# Patient Record
Sex: FEMALE | Employment: OTHER
[De-identification: names, ages, dates, MRNs, and addresses within clinical notes are randomized per-mention and may not be internally consistent; named-entity substitution may affect disease eponyms.]

---

## 2020-07-29 ENCOUNTER — TRANSCRIBE ORDERS (OUTPATIENT)
Dept: OTHER | Age: 75
End: 2020-07-29

## 2020-07-29 ENCOUNTER — TELEPHONE (OUTPATIENT)
Dept: RHEUMATOLOGY | Facility: CLINIC | Age: 75
End: 2020-07-29

## 2020-07-29 DIAGNOSIS — K13.79 ORAL PAIN OF UNKNOWN ETIOLOGY: ICD-10-CM

## 2020-07-29 DIAGNOSIS — R20.0 NUMBNESS OF FACE: ICD-10-CM

## 2020-07-29 DIAGNOSIS — K14.6 BURNING MOUTH SYNDROME: Primary | ICD-10-CM

## 2020-07-29 NOTE — LETTER
Adult Rheumatology Clinic  [address], Mail [address]                        [place] [address]  Ph: [phone]                     Fax: [phone]   Date: July 29, 2020  Name: Karol Mohs  YOB: 1945  MRN: [id number]       Dear Referring Provider,    Thank you for the referral. Your patient does not meet the criteria for an appointment as numbness of face; Oral pain of unknown etiology is not a diagnosis that the Adult Rheumatology evaluates/manages/treats per our scheduling guidelines. We encourage you to refer your patient to a community Rheumatology clinic such as [facility] or a provider of your choice.     Sincerely,  The Division of Arthritis and Autoimmune Disorders

## 2020-07-29 NOTE — TELEPHONE ENCOUNTER
Patient is referred by Gabe Prakash for Numbness of face; Oral pain of unknown etiology which is not a diagnosis that the Adult Rheumatology clinic evaluates/manages/treats per scheduling protocol.     Letter sent to referring.    Josefina Diaz CMA   7/29/2020 10:57 AM

## 2020-07-30 ENCOUNTER — DOCUMENTATION ONLY (OUTPATIENT)
Dept: CARE COORDINATION | Facility: CLINIC | Age: 75
End: 2020-07-30

## 2020-08-05 ENCOUNTER — PRE VISIT (OUTPATIENT)
Dept: NEUROLOGY | Facility: CLINIC | Age: 75
End: 2020-08-05

## 2020-08-05 ENCOUNTER — TELEPHONE (OUTPATIENT)
Dept: NEUROLOGY | Facility: CLINIC | Age: 75
End: 2020-08-05

## 2020-08-05 NOTE — TELEPHONE ENCOUNTER
[facility] Call Center    Phone Message    May a detailed message be left on voicemail: yes     Reason for Call: Other: Elo calling due to her labs and imaging have not been scheduled yet and was wondering if she needed to reschedule her appointment 8/7/20. Please call her back at your earliest convenience to discuss.     Action Taken: Message routed to:  [facility]  NEUROLOGY    Travel Screening: Not Applicable

## 2020-08-05 NOTE — TELEPHONE ENCOUNTER
FUTURE VISIT INFORMATION      FUTURE VISIT INFORMATION:    Date: 8/7/2020    Time: 145pm    Location: [facility]  REFERRAL INFORMATION:    Referring provider:  Dr. Prakash    Referring providers clinic:  [facility]     Reason for visit/diagnosis  Facial Numbness     RECORDS REQUESTED FROM:       Clinic name Comments Records Status Imaging Status   Internal Dr. Prakash Requested  N/A                                    -8/7/2020-Records from [facility] forwarded to Neurology Nurses and clinic Supervisor HTEO Lino via email-MR @ [id number]

## 2020-08-07 ENCOUNTER — VIRTUAL VISIT (OUTPATIENT)
Dept: NEUROLOGY | Facility: CLINIC | Age: 75
End: 2020-08-07
Payer: MEDICARE

## 2020-08-07 DIAGNOSIS — G50.0 TRIGEMINAL NEURALGIA: Primary | ICD-10-CM

## 2020-08-07 DIAGNOSIS — I63.81 CEREBROVASCULAR ACCIDENT (CVA) DUE TO OCCLUSION OF SMALL ARTERY (H): ICD-10-CM

## 2020-08-07 RX ORDER — SIMVASTATIN 20 MG
1 TABLET ORAL DAILY
COMMUNITY
Start: 2020-02-21

## 2020-08-07 RX ORDER — HYDROCHLOROTHIAZIDE 25 MG/1
1 TABLET ORAL DAILY
COMMUNITY
Start: 2020-05-31

## 2020-08-07 RX ORDER — CLONAZEPAM 1 MG/1
1 TABLET ORAL PRN
COMMUNITY
Start: 2020-04-14

## 2020-08-07 RX ORDER — ATENOLOL 50 MG/1
1 TABLET ORAL DAILY
COMMUNITY
Start: 2019-08-23

## 2020-08-07 NOTE — LETTER
8/7/2020       RE: Karol Mohs  [address]  [facility] [address]     Dear Colleague,    Thank you for referring your patient, Karol Mohs, to the [facility] NEUROLOGY at [facility]. Please see a copy of my visit note below.    [facility] Neurology Consultation    Karol Mohs MRN# [id number]   Age: 74 year old YOB: 1945     Requesting physician: Gabe Prakash     Reason for Consultation: facial numbness      History of Presenting Symptoms:   Karol Mohs is a 74 year old female who presents today for evaluation of facial numbness.  The patient has been followed through Dermatology and was most recently seen 4/14/2020 with Dr. Guillen for burning mouth syndrome.  She was taking Clonazepam 1 mg four times weekly, dissolved in water and swished in mouth for two minutes for her symptoms of burning pain and discomfort with dry mouth in the roof of her mouth.  Prior w/ups had revealed no vitamin deficiencies, normal thyroid, tate ANDRE, SSA.  Prior to seeing dermatology, she was seen through [facility] spine clinic for upper back pain and headaches.  Once she started on clonazepam, she found that her mouth symptoms had improved as well as her headaches.      On 7/26/2020, the patient was seen through the ED in [place] for dizziness which came on abruptly for 10 minutes, was described as room spinning, led to nausea w/discomfort but was without other neurological symptoms.  She was found to have systolic blood pressures in the low 200s during evaluation and she had a heart rate in the 40's.  No head imaging was done, her symptoms improved with zofran and meclizine, and the patient was discharged that day.     Today, she hasn't been taking her clonazepam more than three times a week.  She doesn't swallow the medication, just swishes and spits.  Regarding her prior dizzy period, she doesn't report another occurrence of these symptoms. Her blood pressures have been near  125/87 to 160/85 with multiple measurements through the day.  She is scheduled to see both her PCP and her dermatologist in the next month.    Regarding her facial numbness, she indicates that she gets numb in her roof of the mouth, then upper lip (middle), then nose.  When she gets the numbness, she then will develop a headache.  Her headache starts in the b/l neck and posterior occipital, or it can start in the front of her head.  At times, her pain is an ache that is steady without throbbing.  She has no photophobia, phonophobia, no visual aura.  She doesn't get off balance during these episodes.  Occasionally she may feel nauseated but never vomits.  The symptoms are present every day, but never occur until early afternoon.  The symptoms go away after hours, usually with sleep.  She has tried Tylenol, Aspirin but these have been unhelpful.    The patient was unaware that in 2016 she had imaging which showed a traumatic SAH and a prior lacunar infarction of the right caudate head.  We discussed these images and that she should be on a daily aspirin for stroke prevention.      Past Medical History:   Chronic headache  GERD  HTN  Kidney stones  Sleep apnea  HLD   Chronic lacunar infarct of right caudate head  Hx of SAH (post trauma)     Past Surgical History:   Inguinal herniorrhaphy on left  Lumpectomy on right 1996  tonsilectomy and adenoidectomy     Social History:   , has 4 children. Never smoked. No alcohol use.     Family History:   Mother - heart disease  Father - asthma  Sister - heart disease  Sister - kidney cancer     Medications:   Omeprazole  Simvastatin  Zolpidem  atenolol     Allergies:   Allergies not on file     Review of Systems:   A comprehensive 10 point review of systems (constitutional, ENT, cardiac, peripheral vascular, lymphatic, respiratory, GI, , Musculoskeletal, skin, Neurological) was performed and found to be negative except as described in this note.     Imaging:  CT head:  5/22/2016  IMPRESSION:    1.  Interval involution of a small subarachnoid hemorrhage noted on   previous exam.   2.  No evidence of acute intracranial pathology.   3.  Nasal bone fracture.     CT head: 5/21/2016  - There are 2 small punctate hyperdensities in a superior most right frontoparietal sulci (axial image 37).  Small lacunar infarct of the right caudate head.  Brain attenuation and morphology is otherwise normal.  Gray-white matter differentiation is maintained.  No mass or mass effect.  No extra-axial masses or fluid collections.  Diminutive changes in the lenses of the globe suggests cataract or sequela of cataract surgery.  Mastoid air cells, calvarium and skull base are normal.  Comminuted nasal bone fracture with  adjacent soft tissue swelling.  Please see separate maxillofacial CT for detailed findings.   Bilateral intracranial ICA vascular calcifications.  1.  Tiny hyperdensity in the superior right frontoparietal lobe sulci suggestive of subarachnoid hemorrhage.  Short-term follow-up is suggested.   2.  Comminuted nasal bone fracture with adjacent soft tissue swelling.  Please see separate maxillofacial CT for findings of the maxillofacial structures.   3.  Chronic lacunar infarct right caudate head.         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  I am concerned that her creeping and progressive burning/numbness goes along a distribution common with basilar artery stenosis or occlusions, and given her stroke history with recent ED visit, further imaging with MRI/MRA stroke protocol should be done.  I am unaware how benzodiazepine may be affecting her symptoms, especially if she isn't swallowing the medicatoin, and will leave further management of this to her dermatologist.  If no stroke is seen, or vascular  compression of a trigeminal nerve maxillary branch is noted, she may benefit from a trial of carbamazepine for trigmeinal neuralgia, to see if it reduces her symptoms of roof of mouth, tip of nose sensations.  If this fails, she may benefit from trial of verapamil for a basilar migraine treatment.     Plan:  - MRI/MRA brain w/wout contrast  - ASA 81 mg QD    Follow up in Neurology clinic in 4 weeks or earlier as needed should new concerns arise.    MICHI Eric D.O.   of Neurology    Greater than 50% of the total time (40 min) in this patient encounter was spent on counseling and/or coordination of care. We reviewed diagnostic results, impressions, and discussed other possible tests if symptoms do not improve. We discussed the implications of the diagnosis, as well as risks and benefits of management options. We reviewed treatment instructions and our scheduled follow-up as specified in the discharge plan. We also discussed the importance of compliance with the chosen course of treatment. The patient is in agreement with this plan and has no further questions.

## 2020-08-07 NOTE — PROGRESS NOTES
[facility] Neurology Consultation    Karol Mohs MRN# [id number]   Age: 74 year old YOB: 1945     Requesting physician: Gabe Prakash     Reason for Consultation: facial numbness      History of Presenting Symptoms:   Karol Mohs is a 74 year old female who presents today for evaluation of facial numbness.  The patient has been followed through Dermatology and was most recently seen 4/14/2020 with Dr. Guillen for burning mouth syndrome.  She was taking Clonazepam 1 mg four times weekly, dissolved in water and swished in mouth for two minutes for her symptoms of burning pain and discomfort with dry mouth in the roof of her mouth.  Prior w/ups had revealed no vitamin deficiencies, normal thyroid, tate ANDRE, SSA.  Prior to seeing dermatology, she was seen through [facility] spine clinic for upper back pain and headaches.  Once she started on clonazepam, she found that her mouth symptoms had improved as well as her headaches.      On 7/26/2020, the patient was seen through the ED in [place] for dizziness which came on abruptly for 10 minutes, was described as room spinning, led to nausea w/discomfort but was without other neurological symptoms.  She was found to have systolic blood pressures in the low 200s during evaluation and she had a heart rate in the 40's.  No head imaging was done, her symptoms improved with zofran and meclizine, and the patient was discharged that day.     Today, she hasn't been taking her clonazepam more than three times a week.  She doesn't swallow the medication, just swishes and spits.  Regarding her prior dizzy period, she doesn't report another occurrence of these symptoms. Her blood pressures have been near 125/87 to 160/85 with multiple measurements through the day.  She is scheduled to see both her PCP and her dermatologist in the next month.    Regarding her facial numbness, she indicates that she gets numb in her roof of the mouth, then upper lip (middle), then  nose.  When she gets the numbness, she then will develop a headache.  Her headache starts in the b/l neck and posterior occipital, or it can start in the front of her head.  At times, her pain is an ache that is steady without throbbing.  She has no photophobia, phonophobia, no visual aura.  She doesn't get off balance during these episodes.  Occasionally she may feel nauseated but never vomits.  The symptoms are present every day, but never occur until early afternoon.  The symptoms go away after hours, usually with sleep.  She has tried Tylenol, Aspirin but these have been unhelpful.    The patient was unaware that in 2016 she had imaging which showed a traumatic SAH and a prior lacunar infarction of the right caudate head.  We discussed these images and that she should be on a daily aspirin for stroke prevention.      Past Medical History:   Chronic headache  GERD  HTN  Kidney stones  Sleep apnea  HLD   Chronic lacunar infarct of right caudate head  Hx of SAH (post trauma)     Past Surgical History:   Inguinal herniorrhaphy on left  Lumpectomy on right 1996  tonsilectomy and adenoidectomy     Social History:   , has 4 children. Never smoked. No alcohol use.     Family History:   Mother - heart disease  Father - asthma  Sister - heart disease  Sister - kidney cancer     Medications:   Omeprazole  Simvastatin  Zolpidem  atenolol     Allergies:   Allergies not on file     Review of Systems:   A comprehensive 10 point review of systems (constitutional, ENT, cardiac, peripheral vascular, lymphatic, respiratory, GI, , Musculoskeletal, skin, Neurological) was performed and found to be negative except as described in this note.     Imaging:  CT head: 5/22/2016  IMPRESSION:    1.  Interval involution of a small subarachnoid hemorrhage noted on   previous exam.   2.  No evidence of acute intracranial pathology.   3.  Nasal bone fracture.     CT head: 5/21/2016  - There are 2 small punctate hyperdensities in a  superior most right frontoparietal sulci (axial image 37).  Small lacunar infarct of the right caudate head.  Brain attenuation and morphology is otherwise normal.  Gray-white matter differentiation is maintained.  No mass or mass effect.  No extra-axial masses or fluid collections.  Diminutive changes in the lenses of the globe suggests cataract or sequela of cataract surgery.  Mastoid air cells, calvarium and skull base are normal.  Comminuted nasal bone fracture with  adjacent soft tissue swelling.  Please see separate maxillofacial CT for detailed findings.   Bilateral intracranial ICA vascular calcifications.  1.  Tiny hyperdensity in the superior right frontoparietal lobe sulci suggestive of subarachnoid hemorrhage.  Short-term follow-up is suggested.   2.  Comminuted nasal bone fracture with adjacent soft tissue swelling.  Please see separate maxillofacial CT for findings of the maxillofacial structures.   3.  Chronic lacunar infarct right caudate head.         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  I am concerned that her creeping and progressive burning/numbness goes along a distribution common with basilar artery stenosis or occlusions, and given her stroke history with recent ED visit, further imaging with MRI/MRA stroke protocol should be done.  I am unaware how benzodiazepine may be affecting her symptoms, especially if she isn't swallowing the medicatoin, and will leave further management of this to her dermatologist.  If no stroke is seen, or vascular compression of a trigeminal nerve maxillary branch is noted, she may benefit from a trial of carbamazepine for trigmeinal neuralgia, to see if it reduces her symptoms of roof of mouth, tip of nose sensations.  If this fails, she may benefit from trial of  verapamil for a basilar migraine treatment.     Plan:  - MRI/MRA brain w/wout contrast  - ASA 81 mg QD    Follow up in Neurology clinic in 4 weeks or earlier as needed should new concerns arise.    MICHI Eric D.O.   of Neurology    Greater than 50% of the total time (40 min) in this patient encounter was spent on counseling and/or coordination of care. We reviewed diagnostic results, impressions, and discussed other possible tests if symptoms do not improve. We discussed the implications of the diagnosis, as well as risks and benefits of management options. We reviewed treatment instructions and our scheduled follow-up as specified in the discharge plan. We also discussed the importance of compliance with the chosen course of treatment. The patient is in agreement with this plan and has no further questions.

## 2020-08-07 NOTE — Clinical Note
Could you assist this patient in making sure she gets her MRI stroke protocol in [facility] system?

## 2020-08-10 ENCOUNTER — TELEPHONE (OUTPATIENT)
Dept: NEUROLOGY | Facility: CLINIC | Age: 75
End: 2020-08-10

## 2020-08-10 NOTE — TELEPHONE ENCOUNTER
MRI orders faxed to [facility] in [place].  .  Requested that they push results to  or mail disc to our [facility] address.

## 2020-08-10 NOTE — TELEPHONE ENCOUNTER
Patient states she is returning a call to Nat, Please call to discuss thank you. Patient states the MRI will take place at [facility] in [facility] and the fax number is [phone].

## 2020-08-11 ENCOUNTER — TELEPHONE (OUTPATIENT)
Dept: NEUROLOGY | Facility: CLINIC | Age: 75
End: 2020-08-11

## 2020-08-11 NOTE — TELEPHONE ENCOUNTER
[facility] Call Center    Phone Message    May a detailed message be left on voicemail: no     Reason for Call: Other: Kika calling with questions on Elo's MRI order. Elo stated that she would also need an MRA and that is not on the order. Please fax the MRA order to [phone], if needed. Please call Kika back to confirm the needs of the order.    Action Taken: Other:  NEUROLOGY    Travel Screening: Not Applicable

## 2020-08-11 NOTE — TELEPHONE ENCOUNTER
I returned a call to [facility].  Dr Eric ordered MRI Brain Stroke and told the pt he also wanted a MRA but I do not see that the MRA is ordered.  I sent a message to Dr Eric asking him to place this order.  Once ordered I will fax it to [facility] in [place]

## 2020-08-12 NOTE — TELEPHONE ENCOUNTER
[facility] Call Center    Phone Message    May a detailed message be left on voicemail: yes     Reason for Call: Other: Elo calling to request a call back from the clinic to discuss her MRA order. Elo says the MRI ordeer was sent over to [facility], but they are waiting for her MRA. Elo says she believes Dr. Eric is on vacation, but she would like ot have the MRA done right away with the MRI, so she is wondering if someone else can complete the order to fax over. Elo would like to discuss this with the care team. Please give Elo a call back at your earliest convenience to discuss.     Action Taken: Message routed to:  [facility]  Neuro    Travel Screening: Not Applicable

## 2020-08-13 NOTE — TELEPHONE ENCOUNTER
I returned pt call. I let her know we had checked with the provider covering for Dr. Eric regarding her request for MRA order. From Dr. Eric's office note we are not sure if Dr Eric wanted just MRA head or both Head and neck. We will have to wait for MRA orders until he returns.     Ruth BARKER

## 2020-08-17 DIAGNOSIS — I63.81 CEREBROVASCULAR ACCIDENT (CVA) DUE TO OCCLUSION OF SMALL ARTERY (H): Primary | ICD-10-CM

## 2020-08-18 ENCOUNTER — DOCUMENTATION ONLY (OUTPATIENT)
Dept: NEUROLOGY | Facility: CLINIC | Age: 75
End: 2020-08-18

## 2020-08-18 DIAGNOSIS — G50.0 TRIGEMINAL NEURALGIA: Primary | ICD-10-CM

## 2020-08-19 ENCOUNTER — TELEPHONE (OUTPATIENT)
Dept: NEUROLOGY | Facility: CLINIC | Age: 75
End: 2020-08-19

## 2020-08-19 NOTE — TELEPHONE ENCOUNTER
[facility] Call Center    Phone Message    May a detailed message be left on voicemail: yes     Reason for Call: Order(s): Other:   Reason for requested: MRA/MRI    Date needed: ASAP  Provider name: Dr. Jeaneth Anand from [facility] is calling to get an OK to follow RAD protocol for the orders. Pt is scheduled this Friday and needs confirmation ASAP       Action Taken: Other:  NEUROLOGY     Travel Screening: Not Applicable

## 2020-08-21 ENCOUNTER — TRANSFERRED RECORDS (OUTPATIENT)
Dept: HEALTH INFORMATION MANAGEMENT | Facility: CLINIC | Age: 75
End: 2020-08-21

## 2020-08-24 ENCOUNTER — TELEPHONE (OUTPATIENT)
Dept: NEUROLOGY | Facility: CLINIC | Age: 75
End: 2020-08-24

## 2020-08-24 NOTE — TELEPHONE ENCOUNTER
Received MRI from [facility]   Records Date of service: 8/21/20  Copy has been sent to scanning and encounter routed to specialty nurse for review.

## 2020-09-01 ENCOUNTER — TELEPHONE (OUTPATIENT)
Dept: NEUROLOGY | Facility: CLINIC | Age: 75
End: 2020-09-01

## 2020-09-01 NOTE — TELEPHONE ENCOUNTER
Health Call Center    Phone Message    May a detailed message be left on voicemail: yes     Reason for Call: Requesting Results   Name/type of test: MRI/MRA   Date of test: 8/21/20  Was test done at a location other than [facility] (Please fill in the location if not [facility])?: Yes: per pt at [facility] ([facility]).     And, pt reported that she received letter just last week that was dated on 8/07/20 from this provider that indicated, pt should schedule f/u in 4 weeks.  Questions:  Is this appt a virtual or in clinic appt?  And, 4 weeks from what date, 8/07/20?  Please call pt back to schedule the appt for her and give results of MRI/MRA.  Thank you.    Action Taken: Message routed to:  [facility]  Neurology    Travel Screening: Not Applicable

## 2020-09-02 NOTE — TELEPHONE ENCOUNTER
I called Elo back. I let her know Dr Eric reviewed her imaging and results were normal. He would like to have her follow up in a month. I helped her schedule on 9/30 at 9am for in person follow up. I provided clinic information as this will be her first in clinic appointment.     Ruth BARKER

## 2020-10-02 ENCOUNTER — VIRTUAL VISIT (OUTPATIENT)
Dept: NEUROLOGY | Facility: CLINIC | Age: 75
End: 2020-10-02
Payer: MEDICARE

## 2020-10-02 DIAGNOSIS — G50.0 TRIGEMINAL NEURALGIA: Primary | ICD-10-CM

## 2020-10-02 PROBLEM — K14.6 BURNING MOUTH SYNDROME: Status: ACTIVE | Noted: 2020-07-15

## 2020-10-02 PROBLEM — E11.9 TYPE 2 DIABETES MELLITUS WITHOUT COMPLICATION, WITHOUT LONG-TERM CURRENT USE OF INSULIN (H): Status: ACTIVE | Noted: 2020-06-22

## 2020-10-02 PROBLEM — Z12.11 SCREENING FOR COLON CANCER: Status: ACTIVE | Noted: 2017-12-13

## 2020-10-02 PROCEDURE — 99213 OFFICE O/P EST LOW 20 MIN: CPT | Mod: 95 | Performed by: PSYCHIATRY & NEUROLOGY

## 2020-10-02 RX ORDER — CARBAMAZEPINE 200 MG/1
TABLET ORAL
Qty: 120 TABLET | Refills: 1 | Status: SHIPPED | OUTPATIENT
Start: 2020-10-02 | End: 2020-11-24

## 2020-10-02 NOTE — PROGRESS NOTES
[facility] Neurology Follow Up Visit    Karol Mohs MRN# [id number]   Age: 74 year old YOB: 1945     Brief history of symptoms: The patient was initially seen in neurologic consultation on 8/7/2020 for evaluation of trigeminal neuralgia. Please see the comprehensive neurologic consultation note from that date in the Epic records for details. The patient's symptoms were that of numbness in her palate, upper lip, then nose followed by headache in the posterior occipital region.  It was also discussed that the patient had prior traumatic SAH as well as right caudate head lacunar infarction for which she was recommended to be on ASA.  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  She was to have MRI/MRA brain, continue with ASA, and her imaging was normal.  It was thought that if she had no major vascular findings, she may benefit from trial of carbamazepine for TN or verapamil for basilar migraine.    Interval history: After the patient stopped swishing clonazepam, she hasn't noticed a drastic change in her symptoms.  Last week, she had her first episode of mouth pain since our last visit.  Magic mouthwash seems to help tone down her mouth pain.  There is no specific taste involvement. Her most recent event of mouth sensory disturbance into the face wasn't associated with a headache of the posterior occiput.     Medications:     Current Outpatient Medications   Medication Sig     atenolol (TENORMIN) 50 MG tablet Take 1 tablet by mouth daily     clonazePAM (KLONOPIN) 1 MG tablet Take 1 tablet by mouth as needed      hydrochlorothiazide (HYDRODIURIL) 25 MG tablet Take 1 tablet by mouth daily     omeprazole (PRILOSEC) 20 MG DR capsule Take 1 capsule by mouth daily     simvastatin (ZOCOR) 20 MG tablet Take 1 tablet by mouth daily     zolpidem ER (AMBIEN CR) 12.5 MG CR tablet Take 1 tablet by mouth daily       Review of Systems:   A comprehensive 10 point review of systems (constitutional, ENT, cardiac, peripheral vascular, lymphatic, respiratory, GI, , Musculoskeletal, skin, Neurological) was performed and found to be negative except as described in this note.      Physical Exam:   General: Seated comfortably in no acute distress.  HEENT: Neck supple with normal range of motion.   Skin: No rashes  Neurologic:     Mental Status: Fully alert, attentive and oriented. Speech clear and fluent, no paraphasic errors.     Cranial Nerves: EOMI with normal smooth pursuit. Facial movements symmetric. Hearing not formally tested but intact to conversation.  No dysarthria.     Motor: No tremors or other abnormal movements observed.      Sensory:Negative Romberg.          Assessment and Plan:   Assessment:  trigeminal irritation or possibly glossopharyngeal irritation    The patient has paresthesias of the trigeminal and possibly the glossopharyngeal nerve regions which fluctuate in severity and can occur with or without secondary posterior occipital headache.  There is little on imaging to suggest etiology such as tumor, vascular compromise of nerve, or brainstem/thalamic/Internal capsule lesion.  At this time, trial of carbamazepine would be the next step in management along with continued use of magic mouthwash or possible inclusion of capsaicin ointment.  If this is primarily a neuropathic related pain, these agents together should be helpful to reduce frequency and severity of symptoms.  The patient will trial the medication on lower doses to avoid secondary side effects like nausea/dizziness/fatigue.     Plan:  Carbamazepine 100 mg QAM for 7 days, then  Carbamazepine 100 mg BID for 7 days, then  Carbamazepine 200/100 for 7 days, then  Carbamazepine 200/200 for 4 weeks    Patient will contact myself after 4 weeks on 200/200 dosing to discuss ongoing titration or symptom relief/progression      Follow up in Neurology clinic in 3  months or earlier as needed should new concerns arise.    MICHI Eric D.O.   of Neurology      Greater than 50% of the total time (20 min) in this patient encounter was spent on counseling and/or coordination of care. We reviewed diagnostic results, impressions, and discussed other possible tests if symptoms do not improve. We discussed the implications of the diagnosis, as well as risks and benefits of management options. We reviewed treatment instructions and our scheduled follow-up as specified in the discharge plan. We also discussed the importance of compliance with the chosen course of treatment. The patient is in agreement with this plan and has no further questions.

## 2020-10-02 NOTE — LETTER
Date:October 8, 2020      Patient was self referred, no letter generated. Do not send.        [facility] Health Information

## 2020-10-02 NOTE — PROGRESS NOTES
"Karol Mohs is a 74 year old female who is being evaluated via a billable video visit.      The patient has been notified of following:     \"This video visit will be conducted via a call between you and your physician/provider. We have found that certain health care needs can be provided without the need for an in-person physical exam.  This service lets us provide the care you need with a video conversation.  If a prescription is necessary we can send it directly to your pharmacy.  If lab work is needed we can place an order for that and you can then stop by our lab to have the test done at a later time.    Video visits are billed at different rates depending on your insurance coverage.  Please reach out to your insurance provider with any questions.    If during the course of the call the physician/provider feels a video visit is not appropriate, you will not be charged for this service.\"    Patient has given verbal consent for Video visit? YES  How would you like to obtain your AVS? mychart  If you are dropped from the video visit, the video invite should be resent to:  Will anyone else be joining your video visit?         Video-Visit Details    Type of service:  Video Visit    Video Start Time: 215 pm  Video End Time: 2:43 PM    Originating Location (pt. Location): Home    Distant Location (provider location):  [facility] NEUROLOGY [facility]     Platform used for Video Visit: Ivan Agarwal, EMT  "

## 2020-10-02 NOTE — LETTER
10/2/2020       RE: Karol Mohs  [address]  [place] [address]     Dear Colleague,    Thank you for referring your patient, Karol Mohs, to the [facility]OLOG[facility] [place] at [facility]. Please see a copy of my visit note below.    [facility] Neurology Follow Up Visit    Karol Mohs MRN# [id number]   Age: 74 year old YOB: 1945     Brief history of symptoms: The patient was initially seen in neurologic consultation on 8/7/2020 for evaluation of trigeminal neuralgia. Please see the comprehensive neurologic consultation note from that date in the Epic records for details. The patient's symptoms were that of numbness in her palate, upper lip, then nose followed by headache in the posterior occipital region.  It was also discussed that the patient had prior traumatic SAH as well as right caudate head lacunar infarction for which she was recommended to be on ASA.  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  She was to have MRI/MRA brain, continue with ASA, and her imaging was normal.  It was thought that if she had no major vascular findings, she may benefit from trial of carbamazepine for TN or verapamil for basilar migraine.    Interval history: After the patient stopped swishing clonazepam, she hasn't noticed a drastic change in her symptoms.  Last week, she had her first episode of mouth pain since our last visit.  Magic mouthwash seems to help tone down her mouth pain.  There is no specific taste involvement. Her most recent event of mouth sensory disturbance into the face wasn't associated with a headache of the posterior occiput.     Medications:     Current Outpatient Medications   Medication Sig     atenolol (TENORMIN) 50 MG tablet Take 1 tablet by mouth daily     clonazePAM (KLONOPIN) 1 MG tablet Take 1 tablet by mouth as needed       hydrochlorothiazide (HYDRODIURIL) 25 MG tablet Take 1 tablet by mouth daily     omeprazole (PRILOSEC) 20 MG DR capsule Take 1 capsule by mouth daily     simvastatin (ZOCOR) 20 MG tablet Take 1 tablet by mouth daily     zolpidem ER (AMBIEN CR) 12.5 MG CR tablet Take 1 tablet by mouth daily      Review of Systems:   A comprehensive 10 point review of systems (constitutional, ENT, cardiac, peripheral vascular, lymphatic, respiratory, GI, , Musculoskeletal, skin, Neurological) was performed and found to be negative except as described in this note.      Physical Exam:   General: Seated comfortably in no acute distress.  HEENT: Neck supple with normal range of motion.   Skin: No rashes  Neurologic:     Mental Status: Fully alert, attentive and oriented. Speech clear and fluent, no paraphasic errors.     Cranial Nerves: EOMI with normal smooth pursuit. Facial movements symmetric. Hearing not formally tested but intact to conversation.  No dysarthria.     Motor: No tremors or other abnormal movements observed.      Sensory:Negative Romberg.          Assessment and Plan:   Assessment:  trigeminal irritation or possibly glossopharyngeal irritation    The patient has paresthesias of the trigeminal and possibly the glossopharyngeal nerve regions which fluctuate in severity and can occur with or without secondary posterior occipital headache.  There is little on imaging to suggest etiology such as tumor, vascular compromise of nerve, or brainstem/thalamic/Internal capsule lesion.  At this time, trial of carbamazepine would be the next step in management along with continued use of magic mouthwash or possible inclusion of capsaicin ointment.  If this is primarily a neuropathic related pain, these agents together should be helpful to reduce frequency and severity of symptoms.  The patient will trial the medication on lower doses to avoid secondary side effects like nausea/dizziness/fatigue.     Plan:  Carbamazepine 100 mg QAM  "for 7 days, then  Carbamazepine 100 mg BID for 7 days, then  Carbamazepine 200/100 for 7 days, then  Carbamazepine 200/200 for 4 weeks    Patient will contact myself after 4 weeks on 200/200 dosing to discuss ongoing titration or symptom relief/progression      Follow up in Neurology clinic in 3 months or earlier as needed should new concerns arise.    MICHI Eric D.O.   of Neurology      Greater than 50% of the total time (20 min) in this patient encounter was spent on counseling and/or coordination of care. We reviewed diagnostic results, impressions, and discussed other possible tests if symptoms do not improve. We discussed the implications of the diagnosis, as well as risks and benefits of management options. We reviewed treatment instructions and our scheduled follow-up as specified in the discharge plan. We also discussed the importance of compliance with the chosen course of treatment. The patient is in agreement with this plan and has no further questions.      Karol Mohs is a 74 year old female who is being evaluated via a billable video visit.      The patient has been notified of following:     \"This video visit will be conducted via a call between you and your physician/provider. We have found that certain health care needs can be provided without the need for an in-person physical exam.  This service lets us provide the care you need with a video conversation.  If a prescription is necessary we can send it directly to your pharmacy.  If lab work is needed we can place an order for that and you can then stop by our lab to have the test done at a later time.    Video visits are billed at different rates depending on your insurance coverage.  Please reach out to your insurance provider with any questions.    If during the course of the call the physician/provider feels a video visit is not appropriate, you will not be charged for this service.\"    Patient has given verbal consent " for Video visit? YES  How would you like to obtain your AVS? mychart  If you are dropped from the video visit, the video invite should be resent to:  Will anyone else be joining your video visit?         Video-Visit Details    Type of service:  Video Visit    Video Start Time: 215 pm  Video End Time: 2:43 PM    Originating Location (pt. Location): Home    Distant Location (provider location):  [facility] NEUROLOGY [facility]     Platform used for Video Visit: Ivan Agarwal, EMT      Again, thank you for allowing me to participate in the care of your patient.      Sincerely,    Zain Eric, DO

## 2020-11-02 ENCOUNTER — VIRTUAL VISIT (OUTPATIENT)
Dept: NEUROLOGY | Facility: CLINIC | Age: 75
End: 2020-11-02
Payer: MEDICARE

## 2020-11-02 DIAGNOSIS — G50.0 TRIGEMINAL NEURALGIA: Primary | ICD-10-CM

## 2020-11-02 PROCEDURE — 99442 PR PHYSICIAN TELEPHONE EVALUATION 11-20 MIN: CPT | Mod: 95 | Performed by: PSYCHIATRY & NEUROLOGY

## 2020-11-02 RX ORDER — CLOBETASOL PROPIONATE 0.5 MG/G
OINTMENT TOPICAL PRN
COMMUNITY
Start: 2020-10-27

## 2020-11-02 RX ORDER — CEPHALEXIN 500 MG/1
500 CAPSULE ORAL
COMMUNITY
Start: 2020-10-27 | End: 2020-11-06

## 2020-11-02 RX ORDER — LISINOPRIL 20 MG/1
20 TABLET ORAL DAILY
COMMUNITY
Start: 2020-10-30

## 2020-11-02 NOTE — LETTER
Date:November 22, 2020      Patient was self referred, no letter generated. Do not send.        [facility] Health Information

## 2020-11-02 NOTE — PROGRESS NOTES
[facility] Neurology Follow Up Visit    Karol Mohs MRN# [id number]   Age: 74 year old YOB: 1945     Brief history of symptoms: The patient was initially seen in neurologic consultation on 8/7/2020 and for follow up on 10/2/2020 for evaluation of trigeminal neuralgia. Please see the comprehensive neurologic consultation note from that date in the Epic records for details.  Symptoms were primarily of numbness in upper palate, upper lip sensory issues leading to headache in the posterior occipital region in the setting of prior traumatic SAH and right caudate lacunar infarction and burning mouth syndrome.  Imaging with MRI/MRA brain was normal.  She was to trial Carbamazepine for TN, and consider verapamil in the future.  On follow up, she was taking magic mouthwash, which was helping some of her episodic mouth pain.      Interval history: the patient still has some mouth pain while taking magic mouth wash.   The patient does report that Carbamazepine has both helped her mouth pain and reduce her headaches.  She has noticed a rash around her waist line which was quite itchy, and is using a cream for this rash.  She also reports feeling fatigued and tired while on Carbamazepine.        Medications:     Current Outpatient Medications   Medication Sig     atenolol (TENORMIN) 50 MG tablet Take 1 tablet by mouth daily     carBAMazepine (TEGRETOL) 200 MG tablet 1/2 tab in AM for 7 days, then 1/2 tab twice a day for 7 days, then 1 tab AM and 1/2 tab PM for 7 days, then 1 tab twice daily     clonazePAM (KLONOPIN) 1 MG tablet Take 1 tablet by mouth as needed      hydrochlorothiazide (HYDRODIURIL) 25 MG tablet Take 1 tablet by mouth daily     omeprazole (PRILOSEC) 20 MG DR capsule Take 1 capsule by mouth daily     simvastatin (ZOCOR) 20 MG tablet Take 1 tablet by mouth daily     zolpidem ER (AMBIEN CR) 12.5 MG CR tablet Take 1 tablet by mouth daily      Review of Systems:   A comprehensive 10 point review of systems  (constitutional, ENT, cardiac, peripheral vascular, lymphatic, respiratory, GI, , Musculoskeletal, skin, Neurological) was performed and found to be negative except as described in this note.          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  I am uncertain if Carbamazepine alone is leading to her fatigue versus other health issues, but reducing the dose slightly may be beneficial.  I suspect that her skin sensitivity may be in part related to Carbamazepine, and further reduction in dose may be helpful on top of her creams.  If her rash and fatigue continue in the next 2 weeks, alternatives for neuropathic pain and headache may be needed.     Plan:  Carbamazepine 100 mg QAM and 200 mg at bedtime  Discuss in 2 weeks over Mychart regarding fatigue reduction with symptom control      Follow up in Neurology clinic in 6 months or earlier as needed should new concerns arise.    MICHI Eric D.O.   of Neurology      Greater than 50% of the total time (15 min) in this patient encounter was spent on counseling and/or coordination of care. We reviewed diagnostic results, impressions, and discussed other possible tests if symptoms do not improve. We discussed the implications of the diagnosis, as well as risks and benefits of management options. We reviewed treatment instructions and our scheduled follow-up as specified in the discharge plan. We also discussed the importance of compliance with the chosen course of treatment. The patient is in agreement with this plan and has no further questions.

## 2020-11-02 NOTE — PROGRESS NOTES
"Karol Mohs is a 74 year old female who is being evaluated via a billable telephone visit.      The patient has been notified of following:     \"This telephone visit will be conducted via a call between you and your physician/provider. We have found that certain health care needs can be provided without the need for a physical exam.  This service lets us provide the care you need with a short phone conversation.  If a prescription is necessary we can send it directly to your pharmacy.  If lab work is needed we can place an order for that and you can then stop by our lab to have the test done at a later time.    Telephone visits are billed at different rates depending on your insurance coverage. During this emergency period, for some insurers they may be billed the same as an in-person visit.  Please reach out to your insurance provider with any questions.    If during the course of the call the physician/provider feels a telephone visit is not appropriate, you will not be charged for this service.\"    Patient has given verbal consent for Telephone visit?  Yes    What phone number would you like to be contacted at? [phone]    How would you like to obtain your AVS? [facility]    Phone call duration: 16 minutes    Zain Eric, DO      "

## 2020-11-02 NOTE — LETTER
11/2/2020       RE: Karol Mohs  [address]  [place] [address]     Dear Colleague,    Thank you for referring your patient, Karol Mohs, to the [facility]UROLOG[facility] [place] at [facility]. Please see a copy of my visit note below.    [facility] Neurology Follow Up Visit    Karol Mohs MRN# [id number]   Age: 74 year old YOB: 1945     Brief history of symptoms: The patient was initially seen in neurologic consultation on 8/7/2020 and for follow up on 10/2/2020 for evaluation of trigeminal neuralgia. Please see the comprehensive neurologic consultation note from that date in the Epic records for details.  Symptoms were primarily of numbness in upper palate, upper lip sensory issues leading to headache in the posterior occipital region in the setting of prior traumatic SAH and right caudate lacunar infarction and burning mouth syndrome.  Imaging with MRI/MRA brain was normal.  She was to trial Carbamazepine for TN, and consider verapamil in the future.  On follow up, she was taking magic mouthwash, which was helping some of her episodic mouth pain.      Interval history: the patient still has some mouth pain while taking magic mouth wash.   The patient does report that Carbamazepine has both helped her mouth pain and reduce her headaches.  She has noticed a rash around her waist line which was quite itchy, and is using a cream for this rash.  She also reports feeling fatigued and tired while on Carbamazepine.        Medications:     Current Outpatient Medications   Medication Sig     atenolol (TENORMIN) 50 MG tablet Take 1 tablet by mouth daily     carBAMazepine (TEGRETOL) 200 MG tablet 1/2 tab in AM for 7 days, then 1/2 tab twice a day for 7 days, then 1 tab AM and 1/2 tab PM for 7 days, then 1 tab twice daily     clonazePAM (KLONOPIN) 1 MG tablet Take 1 tablet by mouth as needed      hydrochlorothiazide (HYDRODIURIL) 25 MG tablet Take 1 tablet by mouth  daily     omeprazole (PRILOSEC) 20 MG DR capsule Take 1 capsule by mouth daily     simvastatin (ZOCOR) 20 MG tablet Take 1 tablet by mouth daily     zolpidem ER (AMBIEN CR) 12.5 MG CR tablet Take 1 tablet by mouth daily      Review of Systems:   A comprehensive 10 point review of systems (constitutional, ENT, cardiac, peripheral vascular, lymphatic, respiratory, GI, , Musculoskeletal, skin, Neurological) was performed and found to be negative except as described in this note.          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  I am uncertain if Carbamazepine alone is leading to her fatigue versus other health issues, but reducing the dose slightly may be beneficial.  I suspect that her skin sensitivity may be in part related to Carbamazepine, and further reduction in dose may be helpful on top of her creams.  If her rash and fatigue continue in the next 2 weeks, alternatives for neuropathic pain and headache may be needed.     Plan:  Carbamazepine 100 mg QAM and 200 mg at bedtime  Discuss in 2 weeks over Mychart regarding fatigue reduction with symptom control      Follow up in Neurology clinic in 6 months or earlier as needed should new concerns arise.    MICHI Eric D.O.   of Neurology      Greater than 50% of the total time (15 min) in this patient encounter was spent on counseling and/or coordination of care. We reviewed diagnostic results, impressions, and discussed other possible tests if symptoms do not improve. We discussed the implications of the diagnosis, as well as risks and benefits of management options. We reviewed treatment instructions and our scheduled follow-up as specified in the discharge plan. We also discussed the importance of compliance with the chosen course of treatment. The patient  "is in agreement with this plan and has no further questions.      Karol Mohs is a 74 year old female who is being evaluated via a billable telephone visit.      The patient has been notified of following:     \"This telephone visit will be conducted via a call between you and your physician/provider. We have found that certain health care needs can be provided without the need for a physical exam.  This service lets us provide the care you need with a short phone conversation.  If a prescription is necessary we can send it directly to your pharmacy.  If lab work is needed we can place an order for that and you can then stop by our lab to have the test done at a later time.    Telephone visits are billed at different rates depending on your insurance coverage. During this emergency period, for some insurers they may be billed the same as an in-person visit.  Please reach out to your insurance provider with any questions.    If during the course of the call the physician/provider feels a telephone visit is not appropriate, you will not be charged for this service.\"    Patient has given verbal consent for Telephone visit?  Yes    What phone number would you like to be contacted at? [phone]    How would you like to obtain your AVS? [facility]    Phone call duration: 16 minutes    Zain Eric, DO          Again, thank you for allowing me to participate in the care of your patient.      Sincerely,    Zain Eric, DO      "

## 2020-11-16 ENCOUNTER — TELEPHONE (OUTPATIENT)
Dept: NEUROLOGY | Facility: CLINIC | Age: 75
End: 2020-11-16

## 2020-11-16 DIAGNOSIS — G50.0 TRIGEMINAL NEURALGIA: Primary | ICD-10-CM

## 2020-11-16 NOTE — TELEPHONE ENCOUNTER
The patient is still having some itching around her waist, which she is using a cream for.  Her headaches and mouth issues are much better.  She does report some non-painful purple discoloration on her skin in small blotches.  I indicated that Carbamazepine can lead to some skin hypersensitivity and could be causing some of her skin irritation at the waist.  Carbamazepine can also lead to agranulocytosis, but generally this occurs around 3 months on the medication.  I think we should check CBC w/platelet diff to look for bone marrow suppression.  The patient will see her PCP next week and will obtain this lab then, with plans to contact me that same week or after to discuss next best options regarding her Carbamazepine dose.    MICHI Eric D.O.  [facility] Neurology

## 2020-11-19 ENCOUNTER — TELEPHONE (OUTPATIENT)
Dept: NEUROLOGY | Facility: CLINIC | Age: 75
End: 2020-11-19

## 2020-11-19 NOTE — TELEPHONE ENCOUNTER
[facility] Call Center    Phone Message    May a detailed message be left on voicemail: yes     Reason for Call: Symptoms or Concerns     If patient has red-flag symptoms, warm transfer to triage line    Current symptom or concern: Itching from head to toe - patient states she thinks it is in regards to carBAMzaepine     Symptoms have been present for:  1 week(s)    Are there any new or worsening symptoms? Yes: new    Patient states she spoke with someone on Monday states that there is no rash anymore but the itching is still present. Patient is wondering if she needs to stop medication     Please advise and call patient back to discuss further       Action Taken: Other: [facility] NEUROLOGY    Travel Screening: Not Applicable

## 2020-11-20 NOTE — TELEPHONE ENCOUNTER
"I returned pt call. I left a voicemail with Dr. Eric's recommendation,   \"She can continue with the medication but should have virtual follow up with myself to discuss her symptoms.  I am not sure as why she is itching and whether it really relates to her carbamazepine\"     I did let her know we have a few openings next week for virtual visits. I ask she call the clinic back soon to set up an appointment.     Ruth BARKER  "

## 2020-11-24 ENCOUNTER — VIRTUAL VISIT (OUTPATIENT)
Dept: NEUROLOGY | Facility: CLINIC | Age: 75
End: 2020-11-24
Payer: MEDICARE

## 2020-11-24 DIAGNOSIS — G50.0 TRIGEMINAL NEURALGIA: Primary | ICD-10-CM

## 2020-11-24 PROCEDURE — 99213 OFFICE O/P EST LOW 20 MIN: CPT | Mod: 95 | Performed by: PSYCHIATRY & NEUROLOGY

## 2020-11-24 RX ORDER — GABAPENTIN 300 MG/1
CAPSULE ORAL
Qty: 120 CAPSULE | Refills: 3 | Status: SHIPPED | OUTPATIENT
Start: 2020-11-24 | End: 2021-01-20

## 2020-11-24 RX ORDER — LOSARTAN POTASSIUM 100 MG/1
100 TABLET ORAL
COMMUNITY
Start: 2020-11-10 | End: 2021-11-15

## 2020-11-24 RX ORDER — AMLODIPINE BESYLATE 5 MG/1
TABLET ORAL
COMMUNITY
Start: 2020-11-23

## 2020-11-24 NOTE — LETTER
11/24/2020       RE: Karol Kae Mohs  [address]  [place] [address]     Dear Colleague,    Thank you for referring your patient, Karol Kae Mohs, to the [facility]OLOG[facility] [place] at [facility]. Please see a copy of my visit note below.    [facility] Neurology Follow Up Visit    Karol Kae Mohs MRN# [id number]   Age: 74 year old YOB: 1945     Brief history of symptoms: The patient was initially seen in neurologic consultation on 8/7/2020, and again for follow up on 10/2/2020 and 11/2/2020 for evaluation of trigeminal neuralgia. Please see the comprehensive neurologic consultation note from that date in the Epic records for details. 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  She trailed Carbamazepine which was both helping reduce mouth pain and headache (frequency/intensity).  The patient reported some feeling of itching and was to be seen for follow up after visiting with Dermatology 10/27/2020 (right medial malar cheek Mohs surgery region crusting, symmetric lower abdomen rash for 2 weeks).  Her rash was thought to possibly related to contact dermatitis, and consideration of biopsy was to be made w/in a month after trial of clobetasol propionate.     Interval history: the patient is taking 1/2 tab in the AM and 1 tab in the PM (100/200), and is less tired.  She has developed itching over her body, with some pruritis on her extensor surfaces.  This itching occurs periodically.  The itching started roughly a week or so after she started the Carbamazepine.      Medications:     Current Outpatient Medications   Medication Sig     atenolol (TENORMIN) 50 MG tablet Take 1 tablet by mouth daily     carBAMazepine (TEGRETOL) 200 MG tablet 1/2 tab in AM for 7 days, then 1/2 tab  twice a day for 7 days, then 1 tab AM and 1/2 tab PM for 7 days, then 1 tab twice daily     clobetasol (TEMOVATE) 0.05 % external ointment APPLY TO AFFECTED AREAS ON ABDOMEN AND BACK TWO TIMES A DAY FOR 2 WEEKS THAN EVERY OTHER DAY FOR 2 WEEKS     clonazePAM (KLONOPIN) 1 MG tablet Take 1 tablet by mouth as needed      hydrochlorothiazide (HYDRODIURIL) 25 MG tablet Take 1 tablet by mouth daily     lisinopril (ZESTRIL) 20 MG tablet Take 20 mg by mouth daily     omeprazole (PRILOSEC) 20 MG DR capsule Take 1 capsule by mouth daily     simvastatin (ZOCOR) 20 MG tablet Take 1 tablet by mouth daily     zolpidem ER (AMBIEN CR) 12.5 MG CR tablet Take 1 tablet by mouth daily      Review of Systems:   A comprehensive 10 point review of systems (constitutional, ENT, cardiac, peripheral vascular, lymphatic, respiratory, GI, , Musculoskeletal, skin, Neurological) was performed and found to be negative except as described in this note.      Physical Exam:   General: Seated comfortably in no acute distress.  HEENT: Neck supple with normal range of motion.   Skin: No rashes  Neurologic:     Mental Status: Fully alert, attentive and oriented. Speech clear and fluent, no paraphasic errors.     Cranial Nerves: EOMI with normal smooth pursuit. Facial movements symmetric. Hearing not formally tested but intact to conversation.  No dysarthria.     Motor: No tremors or other abnormal movements observed.        Assessment and Plan:   Assessment:  TN  Pruritis, likely from Carbamazepine     The patient has multiple periods of generalized itching that started almost 1-2 weeks after Carbamazepine.  Even though this medication has helped the patient's symptoms greatly, the pruritis is not a great trade off.  I will switch the patient to gabapentin and titrate off the Carbamazepine.  If the rash dissipates, I would not return to this medication or even Oxcarbazepine.  If Gabapentin is unsuccessful, then perhaps lamotrigine or another agent can  "be considered.    Plan:  Titrate off Carbamazepine:  100/100 for 3 days, then  100 at bedtime for 3 days, then off completely    Titrate up on Gabapentin:  300 mg at bedtime for 7 days, then  300/300 for 7 days, then  300/300/300    Patient will contact me in one month to discuss symptoms and hopeful resolution of itching/pruritis    Follow up in Neurology clinic in 4 weeks (telephone, 30 minutes) or earlier as needed should new concerns arise.    MICHI Eric D.O.   of Neurology      Greater than 50% of the total time (16 min) in this patient encounter was spent on counseling and/or coordination of care. We reviewed diagnostic results, impressions, and discussed other possible tests if symptoms do not improve. We discussed the implications of the diagnosis, as well as risks and benefits of management options. We reviewed treatment instructions and our scheduled follow-up as specified in the discharge plan. We also discussed the importance of compliance with the chosen course of treatment. The patient is in agreement with this plan and has no further questions.      Karol Kae Mohs is a 74 year old female who is being evaluated via a billable video visit.      The patient has been notified of following:     \"This video visit will be conducted via a call between you and your physician/provider. We have found that certain health care needs can be provided without the need for an in-person physical exam.  This service lets us provide the care you need with a video conversation.  If a prescription is necessary we can send it directly to your pharmacy.  If lab work is needed we can place an order for that and you can then stop by our lab to have the test done at a later time.    Video visits are billed at different rates depending on your insurance coverage.  Please reach out to your insurance provider with any questions.    If during the course of the call the physician/provider feels a video " "visit is not appropriate, you will not be charged for this service.\"    Patient has given verbal consent for Video visit? Yes  How would you like to obtain your AVS? MyChart  If you are dropped from the video visit, the video invite should be resent to: Send to e-mail at: [email or web address]  Will anyone else be joining your video visit? No        Video-Visit Details    Type of service:  Video Visit    Video Start Time: 0900  Video End Time: 9:17 AM    Originating Location (pt. Location): Home    Distant Location (provider location):  [facility] NEUROLOGY [facility]     Platform used for Video Visit: Ivan Blum          Again, thank you for allowing me to participate in the care of your patient.      Sincerely,    Zain Eric, DO      "

## 2020-11-24 NOTE — PROGRESS NOTES
[facility] Neurology Follow Up Visit    Karol Kae Mohs MRN# [id number]   Age: 74 year old YOB: 1945     Brief history of symptoms: The patient was initially seen in neurologic consultation on 8/7/2020, and again for follow up on 10/2/2020 and 11/2/2020 for evaluation of trigeminal neuralgia. Please see the comprehensive neurologic consultation note from that date in the Epic records for details. 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  She trailed Carbamazepine which was both helping reduce mouth pain and headache (frequency/intensity).  The patient reported some feeling of itching and was to be seen for follow up after visiting with Dermatology 10/27/2020 (right medial malar cheek Mohs surgery region crusting, symmetric lower abdomen rash for 2 weeks).  Her rash was thought to possibly related to contact dermatitis, and consideration of biopsy was to be made w/in a month after trial of clobetasol propionate.     Interval history: the patient is taking 1/2 tab in the AM and 1 tab in the PM (100/200), and is less tired.  She has developed itching over her body, with some pruritis on her extensor surfaces.  This itching occurs periodically.  The itching started roughly a week or so after she started the Carbamazepine.      Medications:     Current Outpatient Medications   Medication Sig     atenolol (TENORMIN) 50 MG tablet Take 1 tablet by mouth daily     carBAMazepine (TEGRETOL) 200 MG tablet 1/2 tab in AM for 7 days, then 1/2 tab twice a day for 7 days, then 1 tab AM and 1/2 tab PM for 7 days, then 1 tab twice daily     clobetasol (TEMOVATE) 0.05 % external ointment APPLY TO AFFECTED AREAS ON ABDOMEN AND BACK TWO TIMES A DAY FOR 2 WEEKS THAN EVERY OTHER DAY FOR 2 WEEKS     clonazePAM (KLONOPIN) 1 MG tablet Take 1 tablet by  mouth as needed      hydrochlorothiazide (HYDRODIURIL) 25 MG tablet Take 1 tablet by mouth daily     lisinopril (ZESTRIL) 20 MG tablet Take 20 mg by mouth daily     omeprazole (PRILOSEC) 20 MG DR capsule Take 1 capsule by mouth daily     simvastatin (ZOCOR) 20 MG tablet Take 1 tablet by mouth daily     zolpidem ER (AMBIEN CR) 12.5 MG CR tablet Take 1 tablet by mouth daily      Review of Systems:   A comprehensive 10 point review of systems (constitutional, ENT, cardiac, peripheral vascular, lymphatic, respiratory, GI, , Musculoskeletal, skin, Neurological) was performed and found to be negative except as described in this note.      Physical Exam:   General: Seated comfortably in no acute distress.  HEENT: Neck supple with normal range of motion.   Skin: No rashes  Neurologic:     Mental Status: Fully alert, attentive and oriented. Speech clear and fluent, no paraphasic errors.     Cranial Nerves: EOMI with normal smooth pursuit. Facial movements symmetric. Hearing not formally tested but intact to conversation.  No dysarthria.     Motor: No tremors or other abnormal movements observed.        Assessment and Plan:   Assessment:  TN  Pruritis, likely from Carbamazepine     The patient has multiple periods of generalized itching that started almost 1-2 weeks after Carbamazepine.  Even though this medication has helped the patient's symptoms greatly, the pruritis is not a great trade off.  I will switch the patient to gabapentin and titrate off the Carbamazepine.  If the rash dissipates, I would not return to this medication or even Oxcarbazepine.  If Gabapentin is unsuccessful, then perhaps lamotrigine or another agent can be considered.    Plan:  Titrate off Carbamazepine:  100/100 for 3 days, then  100 at bedtime for 3 days, then off completely    Titrate up on Gabapentin:  300 mg at bedtime for 7 days, then  300/300 for 7 days, then  300/300/300    Patient will contact me in one month to discuss symptoms and  hopeful resolution of itching/pruritis    Follow up in Neurology clinic in 4 weeks (telephone, 30 minutes) or earlier as needed should new concerns arise.    MICHI Eric D.O.   of Neurology      Greater than 50% of the total time (16 min) in this patient encounter was spent on counseling and/or coordination of care. We reviewed diagnostic results, impressions, and discussed other possible tests if symptoms do not improve. We discussed the implications of the diagnosis, as well as risks and benefits of management options. We reviewed treatment instructions and our scheduled follow-up as specified in the discharge plan. We also discussed the importance of compliance with the chosen course of treatment. The patient is in agreement with this plan and has no further questions.

## 2020-11-24 NOTE — PROGRESS NOTES
"Karol Kae Mohs is a 74 year old female who is being evaluated via a billable video visit.      The patient has been notified of following:     \"This video visit will be conducted via a call between you and your physician/provider. We have found that certain health care needs can be provided without the need for an in-person physical exam.  This service lets us provide the care you need with a video conversation.  If a prescription is necessary we can send it directly to your pharmacy.  If lab work is needed we can place an order for that and you can then stop by our lab to have the test done at a later time.    Video visits are billed at different rates depending on your insurance coverage.  Please reach out to your insurance provider with any questions.    If during the course of the call the physician/provider feels a video visit is not appropriate, you will not be charged for this service.\"    Patient has given verbal consent for Video visit? Yes  How would you like to obtain your AVS? MyChart  If you are dropped from the video visit, the video invite should be resent to: Send to e-mail at: [email or web address]  Will anyone else be joining your video visit? No        Video-Visit Details    Type of service:  Video Visit    Video Start Time: 0900  Video End Time: 9:17 AM    Originating Location (pt. Location): Home    Distant Location (provider location):  [facility] NEUROLOGY [facility]     Platform used for Video Visit: Ivan Blum      "

## 2020-11-24 NOTE — LETTER
Date:January 28, 2021      Patient was self referred, no letter generated. Do not send.        [facility] Health Information

## 2020-12-10 ENCOUNTER — TELEPHONE (OUTPATIENT)
Dept: NEUROLOGY | Facility: CLINIC | Age: 75
End: 2020-12-10

## 2020-12-10 NOTE — TELEPHONE ENCOUNTER
I called pt back to discuss her symptoms. She stopped her carbamazepine on 11/30 and reached goal dose of gabapentin 3xday on 12/8.     She said since medication change her itching/rash have not gone away. She went to urgent care on 11/28 and was prescribed a 10day course of prednisone. She said this did not help her itching at all.     She is also feeling loopy and lethargic. She has flashing lights in her vision sometimes, more so when sitting in front of a screen or reading.     Last evening she held her bedtime dose of gabapentin but did take her morning dose today. She is wondering if she should stop gabapentin for a while to clear her system and restart when feeling better. If she should stop how should she titrate off?     I will update Dr. Eric and see what he recommends for pt.     Ruth BARKER

## 2020-12-10 NOTE — TELEPHONE ENCOUNTER
[facility] Call Center    Phone Message    May a detailed message be left on voicemail: yes     Reason for Call: Symptoms or Concerns     If patient has red-flag symptoms, warm transfer to triage line    Current symptom or concern: pt reporting getting loopier and loopier and vision changes, itching from head to toe, sores on her fingers, pt urinating slower, no energy, very dry mouth    Symptoms have been present for: between a couple weeks and some days depending on the specific symptom     Has patient previously been seen for this? No    By: Dr. Zain Eric    Date: pt reporting 11/24/20 during a virtual visit    Are there any new or worsening symptoms? Yes: please call pt asap. Thank you.      Action Taken: Message routed to:  [facility]    Travel Screening: Not Applicable

## 2020-12-10 NOTE — TELEPHONE ENCOUNTER
I returned pt call to let her know Dr. Eric's recommendations. He said she can stop her gabapentin without titration as dose was minimal. She will do this. We also set up a phone follow up in a few weeks 1/4/21 2pm to discuss treatment options.     Ruth BARKER

## 2020-12-14 NOTE — TELEPHONE ENCOUNTER
[facility] Call Center    Phone Message    May a detailed message be left on voicemail: yes     Reason for Call: Symptoms or Concerns     If patient has red-flag symptoms, warm transfer to triage line    Current symptom or concern: itching - thinks it is from carBAMazepine (TEGRETOL) - took the last one on 11/30 - then started taking gabapentin (NEURONTIN) - states that she stopped taking this on 12/10 after speaking with a nurse - patient states that it is itching to the point of where it bleeds. States she just scratches until it bleeds. States it gets worse as the day goes on. States that it hurts to wear clothes. States she has been the walk in clinic twice in [place] was put on prednisone a couple weeks ago - states she went in on 12/11 and they put her on hydroxyzine HCL 25 MG - states that they told her it should help with the itching but states it is not helping. States she is wondering when the itching will go away      Symptoms have been present for:  6 week(s)    Has patient previously been seen for this? No    Are there any new or worsening symptoms? Yes: worsening     Please advise and call patient back at your earliest convenience to discuss       Action Taken: Other: [facility]    Travel Screening: Not Applicable

## 2020-12-14 NOTE — TELEPHONE ENCOUNTER
I returned a call to Elo.  She is reporting that she still has severe itching all over body and that it just continues to get worse throughout the day. She is miserable.   She stopped the tegretol 11/30 and stopped the Gabapentin on 12/10.  I advised that she see her PCP and possibly dermatology.  She states that she does not want to try any new medications for her trigeminal neuralgia until she gets this itching under control.  I will let Dr Eric know.      Message sent to Dr Eric

## 2021-01-04 ENCOUNTER — VIRTUAL VISIT (OUTPATIENT)
Dept: NEUROLOGY | Facility: CLINIC | Age: 76
End: 2021-01-04
Payer: MEDICARE

## 2021-01-04 ENCOUNTER — HEALTH MAINTENANCE LETTER (OUTPATIENT)
Age: 76
End: 2021-01-04

## 2021-01-04 DIAGNOSIS — G50.0 TRIGEMINAL NEURALGIA: ICD-10-CM

## 2021-01-04 DIAGNOSIS — K14.6 BURNING MOUTH SYNDROME: Primary | ICD-10-CM

## 2021-01-04 PROCEDURE — 99443 PR PHYSICIAN TELEPHONE EVALUATION 21-30 MIN: CPT | Mod: 95 | Performed by: PSYCHIATRY & NEUROLOGY

## 2021-01-04 RX ORDER — CETIRIZINE HYDROCHLORIDE 10 MG/1
10 TABLET ORAL DAILY
COMMUNITY

## 2021-01-04 RX ORDER — HYDROXYZINE HYDROCHLORIDE 25 MG/1
25 TABLET, FILM COATED ORAL 4 TIMES DAILY PRN
COMMUNITY
Start: 2020-12-16

## 2021-01-04 RX ORDER — CARBAMAZEPINE 200 MG/1
1 TABLET ORAL SEE ADMIN INSTRUCTIONS
COMMUNITY
Start: 2020-10-02 | End: 2021-01-20

## 2021-01-04 RX ORDER — PREDNISONE 10 MG/1
1 TABLET ORAL SEE ADMIN INSTRUCTIONS
COMMUNITY
Start: 2020-12-16

## 2021-01-04 NOTE — PROGRESS NOTES
[facility] Neurology Follow Up Visit - Telephone    Karol Kae Mohs MRN# [id number]   Age: 75 year old YOB: 1945     Brief history of symptoms: The patient was initially seen in neurologic consultation on 8/7/2020 with follow up most recently on 11/24/2020 for evaluation of trigeminal neuralgia. Please see the comprehensive neurologic consultation note from that date in the Epic records for details.  Overall impression of the patient's facial symptoms was for TN, but upon use of Carbamazepine the patient developed a rash.  She was to switch to gabapentin and titrate off of carbamazpine as per 11/24/2020 visit.    Interval history: The patient was seen 12/16/2020 with her Dermatologist who noted that the patient's rash didn't improve with stoppage of Carbamazepine, or with start of hydroxyzine.  It was thought that the patient had a skin eruption and she was also to have testing done to rule out DRESS syndrome.  She was given prolonged prednisone taper, and had a biopsy.  Tests were unrevealing for DRESS.  As per 12/23/2020 visit with her PCP, the patient was to continue with acetaminophen, wean hydroxyzine, finish prednisone, and stay off losartan.    Today, the patient indicates that the prednisone has immediately helped her itching.  Her itching is still resolving and she will finish the prednisone 1/20/2021.  The patient still has occasional roof of mouth pain, as well as occasional headaches.  She is currently off of gabapentin and had no major side effects the last time she was on it.      Past Medical History:     Patient Active Problem List   Diagnosis     Type 2 diabetes mellitus without complication, without long-term current use of insulin (H)     Subarachnoid hemorrhage (H)     Screening for colon cancer     Insomnia     Hyperlipidemia     Dyslipidemia     Cataract     Burning mouth syndrome      Medications:     Current Outpatient Medications   Medication Sig     amLODIPine (NORVASC) 5 MG tablet       aspirin (ASA) 81 MG EC tablet Take 81 mg by mouth daily     atenolol (TENORMIN) 50 MG tablet Take 1 tablet by mouth daily     clobetasol (TEMOVATE) 0.05 % external ointment APPLY TO AFFECTED AREAS ON ABDOMEN AND BACK TWO TIMES A DAY FOR 2 WEEKS THAN EVERY OTHER DAY FOR 2 WEEKS     clonazePAM (KLONOPIN) 1 MG tablet Take 1 tablet by mouth as needed      gabapentin (NEURONTIN) 300 MG capsule 1 tab nightly for 7 days, then 1 tab in the AM and at night for 7 days, then 1 tab three times daily for 21-60 days     Glucos-MSM-C-Sl-Cpjdon-Jdbxmj (GLUCOSAMINE MSM COMPLEX) TABS tablet Take 2 tablets by mouth daily     hydrochlorothiazide (HYDRODIURIL) 25 MG tablet Take 1 tablet by mouth daily     lisinopril (ZESTRIL) 20 MG tablet Take 20 mg by mouth daily     losartan (COZAAR) 100 MG tablet Take 100 mg by mouth     omeprazole (PRILOSEC) 20 MG DR capsule Take 1 capsule by mouth daily     simvastatin (ZOCOR) 20 MG tablet Take 1 tablet by mouth daily     ZOLPIDEM TARTRATE ER PO Take 10 mg by mouth daily       Review of Systems:   A comprehensive 10 point review of systems (constitutional, ENT, cardiac, peripheral vascular, lymphatic, respiratory, GI, , Musculoskeletal, skin, Neurological) was performed and found to be negative except as described in this note.      Physical Exam:   Telephone         Assessment and Plan:   Assessment:  Burning mouth syndrome with possible TN     The patient responded well to her carbamazepine, but unfortunately she suffered from a severe diffuse rash of the body.  Whether this rash/eruption was 2/2 carbamazepine is difficult to say, but I would hold off on trying this medication or analogs such as oxcarbazepine in the future unless there was no other recourse.  I think a retrial of gabapentin should be the patient's next step before considering alternative agents for TN such as baclofen, lamotrigine, Topamax, Depakote, or Lyrica.     Plan:  Gabapentin 300 at bedtime for one night, then    Gabapentin 300 BID for one night, then  Gabapentin 300 mg TID for one week, then (if no symptom resolution)  Gabapentin 600 mg TID with patient to contact me through [employer]    If gabapentin isn't helpful, consider lamotrigine as next medication    Follow up in Neurology clinic in 3-4 months  or earlier as needed should new concerns arise.    MICHI Eric D.O.   of Neurology      Greater than 50% of the total time (22 (telephone 12, precharting on same day 10) min) in this patient encounter was spent on counseling and/or coordination of care. We reviewed diagnostic results, impressions, and discussed other possible tests if symptoms do not improve. We discussed the implications of the diagnosis, as well as risks and benefits of management options. We reviewed treatment instructions and our scheduled follow-up as specified in the discharge plan. We also discussed the importance of compliance with the chosen course of treatment. The patient is in agreement with this plan and has no further questions.      What phone number would you like to be contacted at? phone  How would you like to obtain your AVS? [id number]      Phone call duration: 12 minutes

## 2021-01-04 NOTE — LETTER
Date:March 13, 2021      Patient was self referred, no letter generated. Do not send.        [facility] Health Information

## 2021-01-04 NOTE — LETTER
1/4/2021       RE: Karol Kae Mohs  [address]  [place] [address]     Dear Colleague,    Thank you for referring your patient, Karol Kae Mohs, to the [facility] NEUROLOGY CLINIC [place] at [facility]. Please see a copy of my visit note below.    [facility] Neurology Follow Up Visit - Telephone    Karol Kae Mohs MRN# [id number]   Age: 75 year old YOB: 1945     Brief history of symptoms: The patient was initially seen in neurologic consultation on 8/7/2020 with follow up most recently on 11/24/2020 for evaluation of trigeminal neuralgia. Please see the comprehensive neurologic consultation note from that date in the Epic records for details.  Overall impression of the patient's facial symptoms was for TN, but upon use of Carbamazepine the patient developed a rash.  She was to switch to gabapentin and titrate off of carbamazpine as per 11/24/2020 visit.    Interval history: The patient was seen 12/16/2020 with her Dermatologist who noted that the patient's rash didn't improve with stoppage of Carbamazepine, or with start of hydroxyzine.  It was thought that the patient had a skin eruption and she was also to have testing done to rule out DRESS syndrome.  She was given prolonged prednisone taper, and had a biopsy.  Tests were unrevealing for DRESS.  As per 12/23/2020 visit with her PCP, the patient was to continue with acetaminophen, wean hydroxyzine, finish prednisone, and stay off losartan.    Today, the patient indicates that the prednisone has immediately helped her itching.  Her itching is still resolving and she will finish the prednisone 1/20/2021.  The patient still has occasional roof of mouth pain, as well as occasional headaches.  She is currently off of gabapentin and had no major side effects the last time she was on it.      Past Medical History:     Patient Active Problem List   Diagnosis     Type 2 diabetes mellitus without complication,  without long-term current use of insulin (H)     Subarachnoid hemorrhage (H)     Screening for colon cancer     Insomnia     Hyperlipidemia     Dyslipidemia     Cataract     Burning mouth syndrome      Medications:     Current Outpatient Medications   Medication Sig     amLODIPine (NORVASC) 5 MG tablet      aspirin (ASA) 81 MG EC tablet Take 81 mg by mouth daily     atenolol (TENORMIN) 50 MG tablet Take 1 tablet by mouth daily     clobetasol (TEMOVATE) 0.05 % external ointment APPLY TO AFFECTED AREAS ON ABDOMEN AND BACK TWO TIMES A DAY FOR 2 WEEKS THAN EVERY OTHER DAY FOR 2 WEEKS     clonazePAM (KLONOPIN) 1 MG tablet Take 1 tablet by mouth as needed      gabapentin (NEURONTIN) 300 MG capsule 1 tab nightly for 7 days, then 1 tab in the AM and at night for 7 days, then 1 tab three times daily for 21-60 days     Glucos-MSM-C-Kz-Rxxrqg-Dbswrj (GLUCOSAMINE MSM COMPLEX) TABS tablet Take 2 tablets by mouth daily     hydrochlorothiazide (HYDRODIURIL) 25 MG tablet Take 1 tablet by mouth daily     lisinopril (ZESTRIL) 20 MG tablet Take 20 mg by mouth daily     losartan (COZAAR) 100 MG tablet Take 100 mg by mouth     omeprazole (PRILOSEC) 20 MG DR capsule Take 1 capsule by mouth daily     simvastatin (ZOCOR) 20 MG tablet Take 1 tablet by mouth daily     ZOLPIDEM TARTRATE ER PO Take 10 mg by mouth daily       Review of Systems:   A comprehensive 10 point review of systems (constitutional, ENT, cardiac, peripheral vascular, lymphatic, respiratory, GI, , Musculoskeletal, skin, Neurological) was performed and found to be negative except as described in this note.      Physical Exam:   Telephone         Assessment and Plan:   Assessment:  Burning mouth syndrome with possible TN     The patient responded well to her carbamazepine, but unfortunately she suffered from a severe diffuse rash of the body.  Whether this rash/eruption was 2/2 carbamazepine is difficult to say, but I would hold off on trying this medication or analogs  such as oxcarbazepine in the future unless there was no other recourse.  I think a retrial of gabapentin should be the patient's next step before considering alternative agents for TN such as baclofen, lamotrigine, Topamax, Depakote, or Lyrica.     Plan:  Gabapentin 300 at bedtime for one night, then   Gabapentin 300 BID for one night, then  Gabapentin 300 mg TID for one week, then (if no symptom resolution)  Gabapentin 600 mg TID with patient to contact me through [employer]    If gabapentin isn't helpful, consider lamotrigine as next medication    Follow up in Neurology clinic in 3-4 months  or earlier as needed should new concerns arise.    MICHI Eric D.O.   of Neurology      Greater than 50% of the total time (22 (telephone 12, precharting on same day 10) min) in this patient encounter was spent on counseling and/or coordination of care. We reviewed diagnostic results, impressions, and discussed other possible tests if symptoms do not improve. We discussed the implications of the diagnosis, as well as risks and benefits of management options. We reviewed treatment instructions and our scheduled follow-up as specified in the discharge plan. We also discussed the importance of compliance with the chosen course of treatment. The patient is in agreement with this plan and has no further questions.      What phone number would you like to be contacted at? phone  How would you like to obtain your AVS? [employer]      Phone call duration: 12 minutes          Again, thank you for allowing me to participate in the care of your patient.      Sincerely,    Zain Eric, DO

## 2021-01-20 DIAGNOSIS — G50.0 TRIGEMINAL NEURALGIA: ICD-10-CM

## 2021-01-20 RX ORDER — GABAPENTIN 300 MG/1
CAPSULE ORAL
Qty: 120 CAPSULE | Refills: 3 | Status: SHIPPED | OUTPATIENT
Start: 2021-01-20 | End: 2021-02-15

## 2021-02-15 DIAGNOSIS — G50.0 TRIGEMINAL NEURALGIA: ICD-10-CM

## 2021-02-15 RX ORDER — GABAPENTIN 300 MG/1
600 CAPSULE ORAL 3 TIMES DAILY
Qty: 360 CAPSULE | Refills: 5 | Status: SHIPPED | OUTPATIENT
Start: 2021-02-15 | End: 2021-04-09

## 2021-04-09 ENCOUNTER — VIRTUAL VISIT (OUTPATIENT)
Dept: NEUROLOGY | Facility: CLINIC | Age: 76
End: 2021-04-09
Payer: MEDICARE

## 2021-04-09 DIAGNOSIS — G50.0 TRIGEMINAL NEURALGIA: ICD-10-CM

## 2021-04-09 PROCEDURE — 99213 OFFICE O/P EST LOW 20 MIN: CPT | Mod: 95 | Performed by: PSYCHIATRY & NEUROLOGY

## 2021-04-09 RX ORDER — GABAPENTIN 300 MG/1
600 CAPSULE ORAL 4 TIMES DAILY
Qty: 220 CAPSULE | Refills: 12 | Status: SHIPPED | OUTPATIENT
Start: 2021-04-09 | End: 2022-04-11

## 2021-04-09 NOTE — PROGRESS NOTES
[facility] Neurology Follow Up Visit    Karol Kae Mohs MRN# [id number]   Age: 75 year old YOB: 1945     Brief history of symptoms: The patient was initially seen in neurologic consultation on 8/7/2020, and most recently on 1/4/2021 for evaluation of burning mouth syndrome. Please see the comprehensive neurologic consultation note from that date in the Epic records for details.  Overall impression of the patient's facial symptoms was for TN, but upon use of Carbamazepine the patient developed a rash (blotchy and scratchy skin condition).  She was to switch to gabapentin and titrate off of carbamazpine as per 11/24/2020 visit.  On follow up 1/4/2021, the patient noted that her rash didn't improve after stopping carbamazepine, and that prednisone was helpful for some of her itching.  It was recommended that she trial gabapentin for further symptom control of presumed TN.    Interval history: The patient tried gabapentin and this was initially leading to some tiredness in the daytime.  This tiredness has abated to some extent.  She is still having roughly a 60 percent reduction in symptoms, and is not having to take her mouth solution anymore.  Her headaches are less frequent (2-3 times per week, not severe, bifrontal).   She is currently taking 600 mg TID of gabapentin.  She takes the AM dose at roughly 7-8am, and the afternoon dose between 3-4pm, and the evening dose at 11-12 pm.       Medications:     Current Outpatient Medications   Medication Sig     amLODIPine (NORVASC) 5 MG tablet      aspirin (ASA) 81 MG EC tablet Take 81 mg by mouth daily     atenolol (TENORMIN) 50 MG tablet Take 1 tablet by mouth daily     Capsaicin 0.05 % GEL Apply 1 Application topically 3 times daily as needed     cetirizine (ZYRTEC) 10 MG tablet Take 10 mg by mouth daily     clobetasol (TEMOVATE) 0.05 % external ointment APPLY TO AFFECTED AREAS ON ABDOMEN AND BACK TWO TIMES A DAY FOR 2 WEEKS THAN EVERY OTHER DAY FOR 2 WEEKS      clonazePAM (KLONOPIN) 1 MG tablet Take 1 tablet by mouth as needed      gabapentin (NEURONTIN) 300 MG capsule Take 2 capsules (600 mg) by mouth 3 times daily     Glucos-MSM-C-Ep-Sfixff-Hpqjer (GLUCOSAMINE MSM COMPLEX) TABS tablet Take 2 tablets by mouth daily     hydrochlorothiazide (HYDRODIURIL) 25 MG tablet Take 1 tablet by mouth daily     hydrOXYzine (ATARAX) 25 MG tablet Take 25 mg by mouth 4 times daily as needed     lisinopril (ZESTRIL) 20 MG tablet Take 20 mg by mouth daily     losartan (COZAAR) 100 MG tablet Take 100 mg by mouth     omeprazole (PRILOSEC) 20 MG DR capsule Take 1 capsule by mouth daily     predniSONE (DELTASONE) 10 MG tablet Take 1 tablet by mouth See Admin Instructions     simvastatin (ZOCOR) 20 MG tablet Take 1 tablet by mouth daily     ZOLPIDEM TARTRATE ER PO Take 10 mg by mouth daily       Review of Systems:   A comprehensive 10 point review of systems (constitutional, ENT, cardiac, peripheral vascular, lymphatic, respiratory, GI, , Musculoskeletal, skin, Neurological) was performed and found to be negative except as described in this note.      Physical Exam:   General: Seated comfortably in no acute distress.  HEENT: Neck supple with normal range of motion.   Skin: No rashes  Neurologic:     Mental Status: Fully alert, attentive and oriented. Speech clear and fluent, no paraphasic errors.     Cranial Nerves: EOMI with normal smooth pursuit. Facial movements symmetric. Hearing not formally tested but intact to conversation.  No dysarthria.     Motor: No tremors or other abnormal movements observed.     Pertinent Investigations since last visit:   None         Assessment and Plan:   Assessment:  Trigeminal neuralgia    The patient has had an excellent response to gabapentin monotherapy with only some minimal symptoms periodically thought the day. The remaining symptoms are manageable, but I suspect the patients levels are at troughs at certain points in the day given her report of  infrequent dosing.  She may benefit from QID dosing structure.  The patient can follow up in one years time, but sooner if new symptoms emerge or her old symptoms return.     Plan:  - Gabapentin 600 mg QID     Follow up in Neurology clinic in one  year or earlier as needed should new concerns arise.    MICHI Eric D.O.   of Neurology      Total time (25 min) in this patient encounter was spent on pre-charting, counseling and/or coordination of care. We reviewed diagnostic results, impressions, and discussed other possible tests if symptoms do not improve. We discussed the implications of the diagnosis, as well as risks and benefits of management options. We reviewed treatment instructions and our scheduled follow-up as specified in the discharge plan. We also discussed the importance of compliance with the chosen course of treatment. The patient is in agreement with this plan and has no further questions.

## 2021-04-09 NOTE — PROGRESS NOTES
Elo is a 75 year old who is being evaluated via a billable video visit.      How would you like to obtain your AVS? Mychart  If the video visit is dropped, the invitation should be resent by: [phone]      Video Start Time: 145  Video-Visit Details    Type of service:  Video Visit    Video End Time:2:11 PM    Originating Location (pt. Location): Home    Distant Location (provider location):  [facility] NEUROLOGY [facility]     Platform used for Video Visit: [employer]

## 2021-04-09 NOTE — LETTER
4/9/2021       RE: Karol Kae Mohs  [address]  [place] [address]     Dear Colleague,    Thank you for referring your patient, Karol Kae Mohs, to the [facility] NEUROLOGY CLINIC [place] at [facility]. Please see a copy of my visit note below.    [facility] Neurology Follow Up Visit    Karol Kae Mohs MRN# [id number]   Age: 75 year old YOB: 1945     Brief history of symptoms: The patient was initially seen in neurologic consultation on 8/7/2020, and most recently on 1/4/2021 for evaluation of burning mouth syndrome. Please see the comprehensive neurologic consultation note from that date in the Epic records for details.  Overall impression of the patient's facial symptoms was for TN, but upon use of Carbamazepine the patient developed a rash (blotchy and scratchy skin condition).  She was to switch to gabapentin and titrate off of carbamazpine as per 11/24/2020 visit.  On follow up 1/4/2021, the patient noted that her rash didn't improve after stopping carbamazepine, and that prednisone was helpful for some of her itching.  It was recommended that she trial gabapentin for further symptom control of presumed TN.    Interval history: The patient tried gabapentin and this was initially leading to some tiredness in the daytime.  This tiredness has abated to some extent.  She is still having roughly a 60 percent reduction in symptoms, and is not having to take her mouth solution anymore.  Her headaches are less frequent (2-3 times per week, not severe, bifrontal).   She is currently taking 600 mg TID of gabapentin.  She takes the AM dose at roughly 7-8am, and the afternoon dose between 3-4pm, and the evening dose at 11-12 pm.       Medications:     Current Outpatient Medications   Medication Sig     amLODIPine (NORVASC) 5 MG tablet      aspirin (ASA) 81 MG EC tablet Take 81 mg by mouth daily     atenolol (TENORMIN) 50 MG tablet Take 1 tablet by mouth  daily     Capsaicin 0.05 % GEL Apply 1 Application topically 3 times daily as needed     cetirizine (ZYRTEC) 10 MG tablet Take 10 mg by mouth daily     clobetasol (TEMOVATE) 0.05 % external ointment APPLY TO AFFECTED AREAS ON ABDOMEN AND BACK TWO TIMES A DAY FOR 2 WEEKS THAN EVERY OTHER DAY FOR 2 WEEKS     clonazePAM (KLONOPIN) 1 MG tablet Take 1 tablet by mouth as needed      gabapentin (NEURONTIN) 300 MG capsule Take 2 capsules (600 mg) by mouth 3 times daily     Glucos-MSM-C-Zg-Wcsqor-Dchnqt (GLUCOSAMINE MSM COMPLEX) TABS tablet Take 2 tablets by mouth daily     hydrochlorothiazide (HYDRODIURIL) 25 MG tablet Take 1 tablet by mouth daily     hydrOXYzine (ATARAX) 25 MG tablet Take 25 mg by mouth 4 times daily as needed     lisinopril (ZESTRIL) 20 MG tablet Take 20 mg by mouth daily     losartan (COZAAR) 100 MG tablet Take 100 mg by mouth     omeprazole (PRILOSEC) 20 MG DR capsule Take 1 capsule by mouth daily     predniSONE (DELTASONE) 10 MG tablet Take 1 tablet by mouth See Admin Instructions     simvastatin (ZOCOR) 20 MG tablet Take 1 tablet by mouth daily     ZOLPIDEM TARTRATE ER PO Take 10 mg by mouth daily       Review of Systems:   A comprehensive 10 point review of systems (constitutional, ENT, cardiac, peripheral vascular, lymphatic, respiratory, GI, , Musculoskeletal, skin, Neurological) was performed and found to be negative except as described in this note.      Physical Exam:   General: Seated comfortably in no acute distress.  HEENT: Neck supple with normal range of motion.   Skin: No rashes  Neurologic:     Mental Status: Fully alert, attentive and oriented. Speech clear and fluent, no paraphasic errors.     Cranial Nerves: EOMI with normal smooth pursuit. Facial movements symmetric. Hearing not formally tested but intact to conversation.  No dysarthria.     Motor: No tremors or other abnormal movements observed.     Pertinent Investigations since last visit:   None         Assessment and Plan:    Assessment:  Trigeminal neuralgia    The patient has had an excellent response to gabapentin monotherapy with only some minimal symptoms periodically thought the day. The remaining symptoms are manageable, but I suspect the patients levels are at troughs at certain points in the day given her report of infrequent dosing.  She may benefit from QID dosing structure.  The patient can follow up in one years time, but sooner if new symptoms emerge or her old symptoms return.     Plan:  - Gabapentin 600 mg QID     Follow up in Neurology clinic in one  year or earlier as needed should new concerns arise.    MICHI Eric D.O.   of Neurology      Total time (25 min) in this patient encounter was spent on pre-charting, counseling and/or coordination of care. We reviewed diagnostic results, impressions, and discussed other possible tests if symptoms do not improve. We discussed the implications of the diagnosis, as well as risks and benefits of management options. We reviewed treatment instructions and our scheduled follow-up as specified in the discharge plan. We also discussed the importance of compliance with the chosen course of treatment. The patient is in agreement with this plan and has no further questions.      Elo is a 75 year old who is being evaluated via a billable video visit.      How would you like to obtain your AVS? Mychart  If the video visit is dropped, the invitation should be resent by: [phone]      Video Start Time: 145  Video-Visit Details    Type of service:  Video Visit    Video End Time:2:11 PM    Originating Location (pt. Location): Home    Distant Location (provider location):  [facility] NEUROLOGY CLINIC [place]     Platform used for Video Visit: [employer]      Again, thank you for allowing me to participate in the care of your patient.      Sincerely,    Zain Eric, DO

## 2021-04-09 NOTE — LETTER
Date:June 17, 2021      Patient was self referred, no letter generated. Do not send.        [facility] Health Information

## 2021-05-01 ENCOUNTER — HEALTH MAINTENANCE LETTER (OUTPATIENT)
Age: 76
End: 2021-05-01

## 2021-08-15 ENCOUNTER — HEALTH MAINTENANCE LETTER (OUTPATIENT)
Age: 76
End: 2021-08-15

## 2021-10-11 ENCOUNTER — HEALTH MAINTENANCE LETTER (OUTPATIENT)
Age: 76
End: 2021-10-11

## 2021-12-05 ENCOUNTER — HEALTH MAINTENANCE LETTER (OUTPATIENT)
Age: 76
End: 2021-12-05

## 2022-01-30 ENCOUNTER — HEALTH MAINTENANCE LETTER (OUTPATIENT)
Age: 77
End: 2022-01-30

## 2022-03-27 ENCOUNTER — HEALTH MAINTENANCE LETTER (OUTPATIENT)
Age: 77
End: 2022-03-27

## 2022-04-11 DIAGNOSIS — G50.0 TRIGEMINAL NEURALGIA: ICD-10-CM

## 2022-04-11 RX ORDER — GABAPENTIN 300 MG/1
600 CAPSULE ORAL 4 TIMES DAILY
Qty: 220 CAPSULE | Refills: 12 | Status: SHIPPED | OUTPATIENT
Start: 2022-04-11 | End: 2022-06-01

## 2022-04-11 NOTE — TELEPHONE ENCOUNTER
Rx Authorization:    Requested Medication/ Dose gabapentin (NEURONTIN) 300 MG capsule    Date last refill ordered: 4/9+/21    Quantity ordered: 220    # refills: 12    Date of last clinic visit with ordering provider: 4/9/21    Date of next clinic visit with ordering provider: 6/1/22    All pertinent protocol data (lab date/result):     Include pertinent information from patients message:

## 2022-06-01 ENCOUNTER — OFFICE VISIT (OUTPATIENT)
Dept: NEUROLOGY | Facility: CLINIC | Age: 77
End: 2022-06-01
Payer: MEDICARE

## 2022-06-01 VITALS — HEART RATE: 85 BPM | OXYGEN SATURATION: 97 % | DIASTOLIC BLOOD PRESSURE: 78 MMHG | SYSTOLIC BLOOD PRESSURE: 118 MMHG

## 2022-06-01 DIAGNOSIS — G50.0 TRIGEMINAL NEURALGIA: ICD-10-CM

## 2022-06-01 PROCEDURE — 99214 OFFICE O/P EST MOD 30 MIN: CPT | Performed by: PSYCHIATRY & NEUROLOGY

## 2022-06-01 RX ORDER — HYDROCORTISONE 2.5 %
CREAM (GRAM) TOPICAL
COMMUNITY
Start: 2022-05-12

## 2022-06-01 RX ORDER — GABAPENTIN 300 MG/1
600 CAPSULE ORAL 4 TIMES DAILY
Qty: 220 CAPSULE | Refills: 12 | Status: SHIPPED | OUTPATIENT
Start: 2022-06-01 | End: 2023-04-19

## 2022-06-01 ASSESSMENT — PAIN SCALES - GENERAL: PAINLEVEL: NO PAIN (0)

## 2022-06-01 NOTE — LETTER
6/1/2022       RE: Karol Kae Mohs  [address]  [place] [address]     Dear Colleague,    Thank you for referring your patient, Karol Kae Mohs, to the [facility] NEUROLOGY CLINIC [place] at [facility]. Please see a copy of my visit note below.    [facility] Neurology Follow Up Visit    Karol Kae Mohs MRN# [id number]   Age: 76 year old YOB: 1945     Brief history of symptoms: The patient was initially seen in neurologic consultation on 8/7/2020 and most recently 4/9/2021 for evaluation of trigeminal neuralgia. Please see the comprehensive neurologic consultation notes from those dates in the Epic records for details.     The patient developed a rash while on CBZ in the past, so she was to trial gabapentin 600 mg TID. She had an excellent response to gabapentin and it was thought she may benefit from QID dosing.    Interval history: There are no new symptoms to report.  She is taking omeprazole and gabapentin together at times.  Her third dose of the day often gets delayed due to timing it with her meal and need for omeprazole. She is currently taking her gabapentin at between / / / midnight.       Physical Exam:   Vitals: /78   Pulse 85   SpO2 97%    General: Seated comfortably in no acute distress.  HEENT: Neck supple with normal range of motion.   Skin: No rashes  Neurologic:     Mental Status: Fully alert, attentive and oriented. Speech clear and fluent, no paraphasic errors. MiniCOG was 4/5 (hand placement was off in regards to her long hand pointing at 10 when it should be at 9). Cube copy correct. Serial 7's with 3 errors. Names 8 words with F in one minute, but no repeats.       Cranial Nerves: EOMI with normal smooth pursuit. Facial movements symmetric. Hearing not formally tested but intact to conversation.  No dysarthria.     Motor: No tremors or other abnormal movements observed.          Assessment and  Plan:   Assessment:  TN on V2 distribution, near midline  Hx of lacunar infarction (R-caudate)    The patient is doing well today with her neuralgia pain management, and I would not necessarily change her dosing structure.  I asked she try to take her gabapentin 20-30 minutes prior to her antacid to avoid issues with low stomach acid leading to reduced efficacy of the medication.  She can stay on ASA daily for stroke prophylaxis given her R-lacunar infarction.    Plan:  - Gabapentin 600 mg QID (please take medication 20-30 minutes prior to omeprazole when both are needed)  - Continue with ASA for stroke prophylaxis    Follow up in Neurology clinic in 12 months, or earlier as needed should new concerns arise.    MICHI Eric D.O.   of Neurology    Total time today (37 min) in this patient encounter was spent on pre-charting, counseling and/or coordination of care.         Again, thank you for allowing me to participate in the care of your patient.      Sincerely,    Zain Eric, DO

## 2022-06-01 NOTE — PATIENT INSTRUCTIONS
I would recommend you stay on your current dose of gabapentin.    - 600 mg QID  - Take your gabapentin evening dose 20-30 minutes prior to your antacid medication  - If swelling worsens and isn't responsive to stockings or medications, then gabapentin may need to be reduced

## 2022-06-01 NOTE — PROGRESS NOTES
[facility] Neurology Follow Up Visit    Karol Kae Mohs MRN# [id number]   Age: 76 year old YOB: 1945     Brief history of symptoms: The patient was initially seen in neurologic consultation on 8/7/2020 and most recently 4/9/2021 for evaluation of trigeminal neuralgia. Please see the comprehensive neurologic consultation notes from those dates in the Epic records for details.     The patient developed a rash while on CBZ in the past, so she was to trial gabapentin 600 mg TID. She had an excellent response to gabapentin and it was thought she may benefit from QID dosing.    Interval history: There are no new symptoms to report.  She is taking omeprazole and gabapentin together at times.  Her third dose of the day often gets delayed due to timing it with her meal and need for omeprazole. She is currently taking her gabapentin at between / / / midnight.       Physical Exam:   Vitals: /78   Pulse 85   SpO2 97%    General: Seated comfortably in no acute distress.  HEENT: Neck supple with normal range of motion.   Skin: No rashes  Neurologic:     Mental Status: Fully alert, attentive and oriented. Speech clear and fluent, no paraphasic errors. MiniCOG was 4/5 (hand placement was off in regards to her long hand pointing at 10 when it should be at 9). Cube copy correct. Serial 7's with 3 errors. Names 8 words with F in one minute, but no repeats.       Cranial Nerves: EOMI with normal smooth pursuit. Facial movements symmetric. Hearing not formally tested but intact to conversation.  No dysarthria.     Motor: No tremors or other abnormal movements observed.          Assessment and Plan:   Assessment:  TN on V2 distribution, near midline  Hx of lacunar infarction (R-caudate)    The patient is doing well today with her neuralgia pain management, and I would not necessarily change her dosing structure.  I asked she try to take her gabapentin 20-30 minutes prior to her antacid to avoid issues  with low stomach acid leading to reduced efficacy of the medication.  She can stay on ASA daily for stroke prophylaxis given her R-lacunar infarction.    Plan:  - Gabapentin 600 mg QID (please take medication 20-30 minutes prior to omeprazole when both are needed)  - Continue with ASA for stroke prophylaxis    Follow up in Neurology clinic in 12 months, or earlier as needed should new concerns arise.    MICHI Eric D.O.   of Neurology    Total time today (37 min) in this patient encounter was spent on pre-charting, counseling and/or coordination of care.

## 2022-06-01 NOTE — LETTER
Date:Gissell 10, 2022      Provider requested that no letter be sent. Do not send.       [facility]

## 2022-06-08 ENCOUNTER — TELEPHONE (OUTPATIENT)
Dept: NEUROLOGY | Facility: CLINIC | Age: 77
End: 2022-06-08
Payer: MEDICARE

## 2022-07-12 ENCOUNTER — TELEPHONE (OUTPATIENT)
Dept: NEUROLOGY | Facility: CLINIC | Age: 77
End: 2022-07-12

## 2022-09-24 ENCOUNTER — HEALTH MAINTENANCE LETTER (OUTPATIENT)
Age: 77
End: 2022-09-24

## 2023-01-29 ENCOUNTER — HEALTH MAINTENANCE LETTER (OUTPATIENT)
Age: 78
End: 2023-01-29

## 2023-03-27 ENCOUNTER — TELEPHONE (OUTPATIENT)
Dept: NEUROLOGY | Facility: CLINIC | Age: 78
End: 2023-03-27
Payer: MEDICARE

## 2023-03-27 NOTE — TELEPHONE ENCOUNTER
Called pt and informed her note per Dr. Eric and she verbally understood and repeated the note back to me:    Yes.  She can take this as long as her kidney function is okay.  They will likely monitor this while in the hospital.     Best,   MICHI Eric D.O.   [facility] Neurology

## 2023-03-27 NOTE — TELEPHONE ENCOUNTER
Health Call Center    Phone Message    May a detailed message be left on voicemail: yes     Reason for Call: Medication Question or concern regarding medication   Prescription Clarification  Name of Medication: gabapentin (NEURONTIN) 300 MG capsule  Prescribing Provider: Dr. Eric    Pharmacy: [id number] - [place], [place] - [address] [place] [address]   What on the order needs clarification? Pt is calling because she is having hip replacement surgery on May 24th and is wondering if she is okay to continue to take this medication. Please call Pt to discuss       Action Taken: Message routed to:  [facility] ([facility] Neurology    Travel Screening: Not Applicable

## 2023-04-19 ENCOUNTER — OFFICE VISIT (OUTPATIENT)
Dept: NEUROLOGY | Facility: CLINIC | Age: 78
End: 2023-04-19
Payer: MEDICARE

## 2023-04-19 VITALS
DIASTOLIC BLOOD PRESSURE: 74 MMHG | OXYGEN SATURATION: 95 % | RESPIRATION RATE: 16 BRPM | SYSTOLIC BLOOD PRESSURE: 110 MMHG | HEART RATE: 88 BPM

## 2023-04-19 DIAGNOSIS — G50.0 TRIGEMINAL NEURALGIA: ICD-10-CM

## 2023-04-19 PROCEDURE — 99214 OFFICE O/P EST MOD 30 MIN: CPT | Performed by: PSYCHIATRY & NEUROLOGY

## 2023-04-19 RX ORDER — COVID-19 ANTIGEN TEST
220 KIT MISCELLANEOUS 2 TIMES DAILY WITH MEALS
COMMUNITY

## 2023-04-19 RX ORDER — GABAPENTIN 600 MG/1
600 TABLET ORAL 4 TIMES DAILY
Qty: 360 TABLET | Refills: 3 | Status: SHIPPED | OUTPATIENT
Start: 2023-04-19 | End: 2024-05-14

## 2023-04-19 RX ORDER — FAMOTIDINE 10 MG
10 TABLET ORAL 2 TIMES DAILY
COMMUNITY

## 2023-04-19 ASSESSMENT — PAIN SCALES - GENERAL: PAINLEVEL: NO PAIN (0)

## 2023-04-19 NOTE — PROGRESS NOTES
[facility] Neurology Follow Up Visit    Karol Kae Mohs MRN# [id number]   Age: 77 year old YOB: 1945     Brief history of symptoms: The patient was initially seen in neurologic consultation on 8/7/2020 and most recently 6/1/2022 for evaluation of TN (V2. Roof of mouth, upper lip, then nose. Mid-line). Please see the comprehensive neurologic consultation notes from those dates in the Epic records for details.     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  She had tried magic mouthwash, and oral clonazepam.  She did have some positive response to gabapentin 600 mg QID following our last visit.  Prior MRI/MRA head and neck were normal.    Today, the patient indicates she takes her medications at 8am, 2pm, 7pm, and midnight.       Physical Exam:   Vitals: /74   Pulse 88   Resp 16   SpO2 95%    General: Seated comfortably in no acute distress.  Neurologic:     Mental Status: Fully alert, attentive and oriented. Speech clear and fluent, no paraphasic errors.     Cranial Nerves: EOMI with normal smooth pursuit. Facial movements symmetric. Hearing not formally tested but intact to conversation.  No dysarthria.     Motor: No tremors or other abnormal movements observed.          Assessment and Plan:   Assessment:  TN (Oral and V2 distribution)    The patient has a good response to gabapentin but her dosing structure could be modified to be less intrusive into her life.  We discussed that omeprazole can inhibit the effectiveness of gabapentin in some patients, but that this is not a given.  We also discussed that she may benefit from a longer acting form of gabapentin if cost isn't prohibitive.  Overall, the patient was okay with using a 600 mg pill compared to 300 mg pill for her dosing to avoid having so many refills required.  She will also try going to TID structures for a  short time to see if she has good symptoms control with it.     Plan:  Gabapentin 600 mg QID    Follow up in Neurology clinic in one year (video) or earlier as needed should new concerns arise.    MICHI Eric D.O.   of Neurology    Total time today (34 min) in this patient encounter was spent on pre-charting, counseling and/or coordination of care.

## 2023-04-19 NOTE — PATIENT INSTRUCTIONS
Encarbil could be a nice option for you in terms of controlling mouth pain but not having to take so many pills.  I think you could discuss this option with your pharmacist to see what it may cost you.    Gabapentin can interact with omeprazole, but it is not a guarantee that this would lead to breakthrough pain for you.  I would ask you try taking them at the same time for a short duration (a week or so), and let me know if you have breakthrough symptoms.    You could also consider changing your dose of gabapentin to three times daily at your convenience.  If you have breakthrough pain, I would recommend continuing with four times daily use.  If pain is well controlled, let me know and I would update your prescription.

## 2023-04-19 NOTE — LETTER
4/19/2023       RE: Karol Kae Mohs  [address]  [place] [address]     Dear Colleague,    Thank you for referring your patient, Karol Kae Mohs, to the [facility] NEUROLOGY CLINIC [place] at [facility]. Please see a copy of my visit note below.    [facility] Neurology Follow Up Visit    Karol Kae Mohs MRN# [id number]   Age: 77 year old YOB: 1945     Brief history of symptoms: The patient was initially seen in neurologic consultation on 8/7/2020 and most recently 6/1/2022 for evaluation of TN (V2. Roof of mouth, upper lip, then nose. Mid-line). Please see the comprehensive neurologic consultation notes from those dates in the Epic records for details.     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  She had tried magic mouthwash, and oral clonazepam.  She did have some positive response to gabapentin 600 mg QID following our last visit.  Prior MRI/MRA head and neck were normal.    Today, the patient indicates she takes her medications at 8am, 2pm, 7pm, and midnight.       Physical Exam:   Vitals: /74   Pulse 88   Resp 16   SpO2 95%    General: Seated comfortably in no acute distress.  Neurologic:     Mental Status: Fully alert, attentive and oriented. Speech clear and fluent, no paraphasic errors.     Cranial Nerves: EOMI with normal smooth pursuit. Facial movements symmetric. Hearing not formally tested but intact to conversation.  No dysarthria.     Motor: No tremors or other abnormal movements observed.          Assessment and Plan:   Assessment:  TN (Oral and V2 distribution)    The patient has a good response to gabapentin but her dosing structure could be modified to be less intrusive into her life.  We discussed that omeprazole can inhibit the effectiveness of gabapentin in some patients, but that this is  not a given.  We also discussed that she may benefit from a longer acting form of gabapentin if cost isn't prohibitive.  Overall, the patient was okay with using a 600 mg pill compared to 300 mg pill for her dosing to avoid having so many refills required.  She will also try going to TID structures for a short time to see if she has good symptoms control with it.     Plan:  Gabapentin 600 mg QID    Follow up in Neurology clinic in one year (video) or earlier as needed should new concerns arise.    MICHI Eric D.O.   of Neurology    Total time today (34 min) in this patient encounter was spent on pre-charting, counseling and/or coordination of care.

## 2023-05-08 ENCOUNTER — HEALTH MAINTENANCE LETTER (OUTPATIENT)
Age: 78
End: 2023-05-08

## 2023-08-05 ENCOUNTER — HEALTH MAINTENANCE LETTER (OUTPATIENT)
Age: 78
End: 2023-08-05

## 2023-10-14 ENCOUNTER — HEALTH MAINTENANCE LETTER (OUTPATIENT)
Age: 78
End: 2023-10-14

## 2024-01-09 ENCOUNTER — TELEPHONE (OUTPATIENT)
Dept: NEUROLOGY | Facility: CLINIC | Age: 79
End: 2024-01-09
Payer: MEDICARE

## 2024-01-09 ENCOUNTER — MYC MEDICAL ADVICE (OUTPATIENT)
Dept: NEUROLOGY | Facility: CLINIC | Age: 79
End: 2024-01-09
Payer: MEDICARE

## 2024-02-25 ENCOUNTER — HEALTH MAINTENANCE LETTER (OUTPATIENT)
Age: 79
End: 2024-02-25

## 2024-04-30 ENCOUNTER — VIRTUAL VISIT (OUTPATIENT)
Dept: NEUROLOGY | Facility: CLINIC | Age: 79
End: 2024-04-30
Payer: MEDICARE

## 2024-04-30 VITALS — WEIGHT: 170 LBS | BODY MASS INDEX: 30.12 KG/M2 | HEIGHT: 63 IN

## 2024-04-30 DIAGNOSIS — G50.0 TRIGEMINAL NEURALGIA: Primary | ICD-10-CM

## 2024-04-30 PROCEDURE — 99213 OFFICE O/P EST LOW 20 MIN: CPT | Mod: 95 | Performed by: PSYCHIATRY & NEUROLOGY

## 2024-04-30 RX ORDER — PREGABALIN 100 MG/1
200 CAPSULE ORAL 2 TIMES DAILY
Qty: 120 CAPSULE | Refills: 1 | Status: SHIPPED | OUTPATIENT
Start: 2024-04-30 | End: 2024-05-14

## 2024-04-30 ASSESSMENT — PAIN SCALES - GENERAL: PAINLEVEL: NO PAIN (0)

## 2024-04-30 NOTE — PROGRESS NOTES
[facility] Neurology Follow Up Visit    Karol Kae Mohs MRN# [id number]   Age: 78 year old YOB: 1945     Brief history of symptoms: The patient was initially seen in neurologic consultation on 8/7/2020 and most recently 4/19/2023 for evaluation of TN. Please see the comprehensive neurologic consultation notes from those dates in the Epic records for details.     The patient initially presented for burning mouth like phenomena, with TN pain of oral and V2 distribution  (mid-line, b/l) being noted. Initial trial of carbamazepine led to DRESS like reaction.  She has had a good response to frequent dosing of gabapentin (600 mg QID).    Today, the patient is doing well.  She has occasionally breakthrough in pain, but this is rare.  When she doesn't use gabapentin she has worsened pain.      Physical Exam:   General: Seated comfortably in no acute distress.  HEENT: Neck supple with normal range of motion.   Skin: No rashes  Neurologic:     Mental Status: Fully alert, attentive and oriented. Speech clear and fluent, no paraphasic errors.     Cranial Nerves: EOMI with normal smooth pursuit. Facial movements symmetric. Hearing not formally tested but intact to conversation.  No dysarthria.     Motor: No tremors or other abnormal movements observed.          Assessment and Plan:   Assessment:  TN of b/l V2 distribution, burning mouth    He patient's gabapentin is helpful, but the dosing structure interferes with her daily life.  She may benefit from a switch to lyrica with twice daily dosing.  If this isn't helpful, then encarbil could be considered but the out of pocket expense may be restricting.       Plan:  Stop Gabapentin, start lyrica  Lyrica 200 mg BID    Follow up in Neurology clinic in 3 months, or earlier as needed should new concerns arise.    MICHI Eric D.O.   of Neurology    Total time today (25 min) in this patient encounter was spent on pre-charting, counseling and/or  coordination of care.

## 2024-04-30 NOTE — LETTER
4/30/2024       RE: Karol Kae Mohs  [address]  [place] [address]       Dear Colleague,    Thank you for referring your patient, Karol Kae Mohs, to the [facility] NEUROLOGY CLINIC [place] at [facility]. Please see a copy of my visit note below.    [facility] Neurology Follow Up Visit    Karol Kae Mohs MRN# [id number]   Age: 78 year old YOB: 1945     Brief history of symptoms: The patient was initially seen in neurologic consultation on 8/7/2020 and most recently 4/19/2023 for evaluation of TN. Please see the comprehensive neurologic consultation notes from those dates in the Epic records for details.     The patient initially presented for burning mouth like phenomena, with TN pain of oral and V2 distribution  (mid-line, b/l) being noted. Initial trial of carbamazepine led to DRESS like reaction.  She has had a good response to frequent dosing of gabapentin (600 mg QID).    Today, the patient is doing well.  She has occasionally breakthrough in pain, but this is rare.  When she doesn't use gabapentin she has worsened pain.      Physical Exam:   General: Seated comfortably in no acute distress.  HEENT: Neck supple with normal range of motion.   Skin: No rashes  Neurologic:     Mental Status: Fully alert, attentive and oriented. Speech clear and fluent, no paraphasic errors.     Cranial Nerves: EOMI with normal smooth pursuit. Facial movements symmetric. Hearing not formally tested but intact to conversation.  No dysarthria.     Motor: No tremors or other abnormal movements observed.          Assessment and Plan:   Assessment:  TN of b/l V2 distribution, burning mouth    He patient's gabapentin is helpful, but the dosing structure interferes with her daily life.  She may benefit from a switch to lyrica with twice daily dosing.  If this isn't helpful, then encarbil could be considered but the out of pocket expense may be restricting.        Plan:  Stop Gabapentin, start lyrica  Lyrica 200 mg BID    Follow up in Neurology clinic in 3 months, or earlier as needed should new concerns arise.      Total time today (25 min) in this patient encounter was spent on pre-charting, counseling and/or coordination of care.         Again, thank you for allowing me to participate in the care of your patient.      Sincerely,    Zain Eirc, DO

## 2024-04-30 NOTE — PATIENT INSTRUCTIONS
I think your gabapentin is working, but I do think the dosing of 4 times daily is somewhat cumbersome given the need to use omeprazole and restrictions with that drug.    It may be worth it to change to a drug with a twice daily dosing structure.  I think you could switch to Lyrica to do this.    At your convenience:  - Stop Gabapentin, on the same day you should start Lyrica   - Lyrica 200 mg twice daily    Let me know after a few days of being on Lyrica how things are going by sending me a message through [employer]

## 2024-04-30 NOTE — PROGRESS NOTES
Virtual Visit Details    Type of service:  Video Visit   Video Start Time:  1058  Video End Time:11:18 AM    Originating Location (pt. Location): Home    Distant Location (provider location):  On-site  Platform used for Video Visit: Ivan

## 2024-05-14 DIAGNOSIS — G50.0 TRIGEMINAL NEURALGIA: ICD-10-CM

## 2024-05-14 RX ORDER — PREGABALIN 100 MG/1
200 CAPSULE ORAL 2 TIMES DAILY
Qty: 180 CAPSULE | Refills: 7 | Status: SHIPPED | OUTPATIENT
Start: 2024-05-14 | End: 2024-08-01

## 2024-05-15 ENCOUNTER — TELEPHONE (OUTPATIENT)
Dept: NEUROLOGY | Facility: CLINIC | Age: 79
End: 2024-05-15
Payer: MEDICARE

## 2024-05-15 NOTE — TELEPHONE ENCOUNTER
Patient confirmed scheduled appointment:  Date: 8/1/24  Time: 8:00 am  Visit type: Return Neurology  Provider: Jeaneth  Location: Virtual  Testing/imaging:   Additional notes: NICOLE Bernal on 5/15/2024 at 11:58 AM    .

## 2024-07-14 ENCOUNTER — HEALTH MAINTENANCE LETTER (OUTPATIENT)
Age: 79
End: 2024-07-14

## 2024-08-01 ENCOUNTER — VIRTUAL VISIT (OUTPATIENT)
Dept: NEUROLOGY | Facility: CLINIC | Age: 79
End: 2024-08-01
Payer: MEDICARE

## 2024-08-01 DIAGNOSIS — G50.0 TRIGEMINAL NEURALGIA: ICD-10-CM

## 2024-08-01 PROCEDURE — G2211 COMPLEX E/M VISIT ADD ON: HCPCS | Mod: 95 | Performed by: PSYCHIATRY & NEUROLOGY

## 2024-08-01 PROCEDURE — 99214 OFFICE O/P EST MOD 30 MIN: CPT | Mod: 95 | Performed by: PSYCHIATRY & NEUROLOGY

## 2024-08-01 RX ORDER — PREGABALIN 100 MG/1
CAPSULE ORAL
Qty: 180 CAPSULE | Refills: 7 | Status: SHIPPED | OUTPATIENT
Start: 2024-08-01

## 2024-08-01 ASSESSMENT — PAIN SCALES - GENERAL: PAINLEVEL: NO PAIN (0)

## 2024-08-01 NOTE — LETTER
8/1/2024       RE: Karol Kae Mohs  [address]  [place] [address]     Dear Colleague,    Thank you for referring your patient, Karol Kae Mohs, to the [facility] NEUROLOGY CLINIC [place] at [facility]. Please see a copy of my visit note below.    [facility] Neurology Follow Up Visit    Karol Kae Mohs MRN# [id number]   Age: 78 year old YOB: 1945     Brief history of symptoms: The patient was initially seen in neurologic consultation on 8/7/2020 and most recently 4/30/2024 for evaluation of TN. Please see the comprehensive neurologic consultation notes from those dates in the Epic records for details.     The patient initially presented for burning mouth like phenomena, with TN pain of oral and V2 distribution (mid-line, b/l) being noted. Initial trial of carbamazepine led to DRESS like reaction. She has had a good response to frequent dosing of gabapentin (600 mg QID). At our last visit, she was to trial Lyrica (titrate up to 200 mg BID) to see if a change in dosing structure interfered with her lifestyle less with continued suppression of facial pain.    Today, the patient does take Lyrica and takes it twice daily.  During the day, she finds the pain is well managed. However, in the evenings she finds she gets headaches with mild facial pain.  She is taking her 1st dose at 1911-7533, and her seconds dose around 2000. Headaches start around 1900.  She does feel that lyrica is making her tired, but this is at 1pm (well after her initial AM dose of Lyrica).      Physical Exam:   General: Seated comfortably in no acute distress.  Neurologic:     Mental Status: Fully alert, attentive and oriented. Speech clear and fluent, no paraphasic errors.     Cranial Nerves: EOMI with normal smooth pursuit. Facial movements symmetric. Hearing not formally tested but intact to conversation.  No dysarthria.       Motor: No tremors or other abnormal movements  observed.          Assessment and Plan:   Assessment:  TN (V2, oral)    The patient is tolerating lyrica with reduced frequency dosing compared to gabapentin, but still has some breakthrough pain leading to headaches in the afternoon.  She could take her PM dosing around 430 PM to combat this.  We will meet again in a few months to see if this leads to any repercussions, like breakthrough pain in the late evening or early AM.  If so, then either increasing dosing or increasing frequency would likely be needed, which could exacerbate issues like leg edema.       Plan:  - Lyrica BID (at 0800, at 1630)    Follow up in Neurology clinic in 3 months (virtual), or earlier as needed should new concerns arise.      Total time today (30 min) in this patient encounter was spent on pre-charting, counseling and/or coordination of care. The longitudinal plan of care for the diagnosis(es)/condition(s) as documented were addressed during this visit. Due to the added complexity in care, I will continue to support Elo in the subsequent management and with ongoing continuity of care.          Again, thank you for allowing me to participate in the care of your patient.      Sincerely,    Zain Eric, DO

## 2024-08-01 NOTE — PROGRESS NOTES
Virtual Visit Details    Type of service:  Video Visit   Video Start Time:  0800  Video End Time:8:20 AM    Originating Location (pt. Location): Home    Distant Location (provider location):  On-site  Platform used for Video Visit: [employer]

## 2024-08-01 NOTE — NURSING NOTE
Current patient location: [address] [place] [id number]    Is the patient currently in the state of [place]? YES    Visit mode:VIDEO    If the visit is dropped, the patient can be reconnected by: VIDEO VISIT: Send to e-mail at: [email or web address]    Will anyone else be joining the visit? NO  (If patient encounters technical issues they should call [phone])    How would you like to obtain your AVS? MyChart    Are changes needed to the allergy or medication list? No    Are refills needed on medications prescribed by this physician? NO    Rooming Documentation:  Assigned questionnaire(s) completed      Reason for visit: Follow Up    Zoila FLORES

## 2024-08-01 NOTE — PROGRESS NOTES
[facility] Neurology Follow Up Visit    Karol Kae Mohs MRN# [id number]   Age: 78 year old YOB: 1945     Brief history of symptoms: The patient was initially seen in neurologic consultation on 8/7/2020 and most recently 4/30/2024 for evaluation of TN. Please see the comprehensive neurologic consultation notes from those dates in the Epic records for details.     The patient initially presented for burning mouth like phenomena, with TN pain of oral and V2 distribution (mid-line, b/l) being noted. Initial trial of carbamazepine led to DRESS like reaction. She has had a good response to frequent dosing of gabapentin (600 mg QID). At our last visit, she was to trial Lyrica (titrate up to 200 mg BID) to see if a change in dosing structure interfered with her lifestyle less with continued suppression of facial pain.    Today, the patient does take Lyrica and takes it twice daily.  During the day, she finds the pain is well managed. However, in the evenings she finds she gets headaches with mild facial pain.  She is taking her 1st dose at 3909-2335, and her seconds dose around 2000. Headaches start around 1900.  She does feel that lyrica is making her tired, but this is at 1pm (well after her initial AM dose of Lyrica).      Physical Exam:   General: Seated comfortably in no acute distress.  Neurologic:     Mental Status: Fully alert, attentive and oriented. Speech clear and fluent, no paraphasic errors.     Cranial Nerves: EOMI with normal smooth pursuit. Facial movements symmetric. Hearing not formally tested but intact to conversation.  No dysarthria.       Motor: No tremors or other abnormal movements observed.          Assessment and Plan:   Assessment:  TN (V2, oral)    The patient is tolerating lyrica with reduced frequency dosing compared to gabapentin, but still has some breakthrough pain leading to headaches in the afternoon.  She could take her PM dosing around 430 PM to combat this.  We will meet again  in a few months to see if this leads to any repercussions, like breakthrough pain in the late evening or early AM.  If so, then either increasing dosing or increasing frequency would likely be needed, which could exacerbate issues like leg edema.       Plan:  - Lyrica BID (at 0800, at 1630)    Follow up in Neurology clinic in 3 months (virtual), or earlier as needed should new concerns arise.    MICHI Eric D.O.   of Neurology    Total time today (30 min) in this patient encounter was spent on pre-charting, counseling and/or coordination of care. The longitudinal plan of care for the diagnosis(es)/condition(s) as documented were addressed during this visit. Due to the added complexity in care, I will continue to support Elo in the subsequent management and with ongoing continuity of care.

## 2024-08-01 NOTE — PATIENT INSTRUCTIONS
Take 200 mg lyrica at 8AM and 430PM.    Discuss foot pain with podiatry provider, if this continues then perhaps an EMG would be helpful to determine where the pain is coming from.

## 2024-08-15 ENCOUNTER — TELEPHONE (OUTPATIENT)
Dept: NEUROLOGY | Facility: CLINIC | Age: 79
End: 2024-08-15
Payer: MEDICARE

## 2024-08-15 NOTE — TELEPHONE ENCOUNTER
Left Voicemail (1st Attempt) and Sent Mychart (1st Attempt) for the patient to call back and schedule the following:    Appointment type: Resched Nov virtual appt  Provider: Dr. Eric  Return date: Nov 2024  Specialty phone number: [phone]  Additional appointment(s) needed:   Additonal Notes:

## 2024-10-19 NOTE — NURSING NOTE
Is the patient currently in the state of [place]? YES    Visit mode:VIDEO    If the visit is dropped, the patient can be reconnected by: VIDEO VISIT: Text to cell phone:   Telephone Information:   Mobile [phone]       Will anyone else be joining the visit? NO  (If patient encounters technical issues they should call [phone]    How would you like to obtain your AVS? MyChart    Are changes needed to the allergy or medication list? No    Are refills needed on medications prescribed by this physician? NO    Reason for visit: Follow Up    Zoila FLORES       52

## 2024-11-07 ENCOUNTER — VIRTUAL VISIT (OUTPATIENT)
Dept: NEUROLOGY | Facility: CLINIC | Age: 79
End: 2024-11-07
Payer: MEDICARE

## 2024-11-07 VITALS — BODY MASS INDEX: 29.44 KG/M2 | WEIGHT: 160 LBS | HEIGHT: 62 IN

## 2024-11-07 DIAGNOSIS — G50.0 TRIGEMINAL NEURALGIA: ICD-10-CM

## 2024-11-07 PROCEDURE — G2211 COMPLEX E/M VISIT ADD ON: HCPCS | Mod: 95 | Performed by: PSYCHIATRY & NEUROLOGY

## 2024-11-07 PROCEDURE — 99214 OFFICE O/P EST MOD 30 MIN: CPT | Mod: 95 | Performed by: PSYCHIATRY & NEUROLOGY

## 2024-11-07 RX ORDER — PREGABALIN 100 MG/1
CAPSULE ORAL
Qty: 180 CAPSULE | Refills: 2 | Status: SHIPPED | OUTPATIENT
Start: 2024-11-07 | End: 2024-11-07

## 2024-11-07 RX ORDER — PREGABALIN 100 MG/1
CAPSULE ORAL
Qty: 180 CAPSULE | Refills: 2 | Status: SHIPPED | OUTPATIENT
Start: 2024-11-07

## 2024-11-07 ASSESSMENT — PAIN SCALES - GENERAL: PAINLEVEL_OUTOF10: NO PAIN (0)

## 2024-11-07 NOTE — PROGRESS NOTES
Virtual Visit Details    Type of service:  Video Visit   Video Start Time:  0956  Video End Time:10:18 AM    Originating Location (pt. Location): Home    Distant Location (provider location):  On-site  Platform used for Video Visit: Ivan

## 2024-11-07 NOTE — LETTER
11/7/2024       RE: Karol Kae Mohs  [address]  [place] [address]     Dear Colleague,    Thank you for referring your patient, Karol Kae Mohs, to the [facility] NEUROLOGY CLINIC [place] at [facility]. Please see a copy of my visit note below.    [facility] Neurology Follow Up Visit    Karol Kae Mohs MRN# [id number]   Age: 78 year old YOB: 1945     Brief history of symptoms: The patient was initially seen in neurologic consultation on 8/7/2020 and most recently 8/1/2024 for evaluation of TN (V2, oral, possibly b/l). Please see the comprehensive neurologic consultation notes from those dates in the Epic records for details.     The patient has had a DRESS reaction to carbamazepine, and some improvement in symptmos with Lyrica BID dosing (200/200). At our last visit, the Lyrica was making her tired around 1pm (well after initial AM dose at 0800 is occurring).  I recommended that for her breakthrough pain, she should try taking her PM dose at 430PM instead of prior to bed.    Today, the patient is taking her Lyrica at 7AM and 4PM.  She finds this has impacted her 7 pm onset headache with mouth pain only mildly.  This is not every evening.  There is no specific relation to stressors, foods, body position when an event of pain does occur.  At this point, she may have episodes 3-4 times a week.   There is no new symptoms emerging at the moment.     Physical Exam:   General: Seated comfortably in no acute distress.  HEENT: Neck supple with normal range of motion.   Skin: No rashes  Neurologic:     Mental Status: Fully alert, attentive and oriented. Speech clear and fluent, no paraphasic errors.     Cranial Nerves: EOMI with normal smooth pursuit. Facial movements symmetric. Hearing not formally tested but intact to conversation.  No dysarthria.     Motor: No tremors or other abnormal movements observed.          Assessment and Plan:   Assessment:  V2  distribution TN, suspected b/l    The patient's mouth pain leading to headaches is responsive to Lyrica, but there is still breakthrough pain around 1900 at night, even with adjusted dosing.  I think she can add a little more to the afternoon dose, but also advised her to take note of days with breakthrough pain and see if there is any pattern (foods or drinks eaten, when meal occurred, exercise or other activity).       Plan:  Lyrica 200/300  Contact me through [facility] in a few weeks to discuss response, if any   - would consider altering dosing structure to three times daily if no response    Follow up in Neurology clinic in 1.5 months, or earlier as needed should new concerns arise.    MICHI Eric D.O.   of Neurology    Total time today (28 min) in this patient encounter was spent on pre-charting, counseling and/or coordination of care. The longitudinal plan of care for the diagnosis(es)/condition(s) as documented were addressed during this visit. Due to the added complexity in care, I will continue to support Elo in the subsequent management and with ongoing continuity of care.        Virtual Visit Details    Type of service:  Video Visit   Video Start Time:  0956  Video End Time:10:18 AM    Originating Location (pt. Location): Home    Distant Location (provider location):  On-site  Platform used for Video Visit: AmWell      Again, thank you for allowing me to participate in the care of your patient.      Sincerely,    Zain Eric, DO

## 2024-11-07 NOTE — NURSING NOTE
Current patient location: [address] [place] [address]    Is the patient currently in the state of [place]? YES    Visit mode:VIDEO    If the visit is dropped, the patient can be reconnected by: VIDEO VISIT: Send to e-mail at: [email or web address]    Will anyone else be joining the visit? NO  (If patient encounters technical issues they should call [phone])    Are changes needed to the allergy or medication list? No    Are refills needed on medications prescribed by this physician? NO    Rooming Documentation:  Not applicable    Reason for visit: MIKAELA Mi VVF

## 2024-11-07 NOTE — Clinical Note
Could someone help this patient set up MyChart and help her use it?  I was asking her to contact me in a few weeks and she expressed concerns she may not know how to do so on MyChart.  Thank you, MICHI

## 2024-11-07 NOTE — PROGRESS NOTES
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 Please see the comprehensive neurologic consultation notes from those dates in the Epic records for details.     The patient has had a DRESS reaction to carbamazepine, and some improvement in symptmos with Lyrica BID dosing (200/200). At our last visit, the Lyrica was making her tired around 1pm (well after initial AM dose at 0800 is occurring).  I recommended that for her breakthrough pain, she should try taking her PM dose at 430PM instead of prior to bed.    Today, the patient is taking her Lyrica at 7AM and 4PM.  She finds this has impacted her 7 pm onset headache with mouth pain only mildly.  This is not every evening.  There is no specific relation to stressors, foods, body position when an event of pain does occur.  At this point, she may have episodes 3-4 times a week.   There is no new symptoms emerging at the moment.     Physical Exam:   General: Seated comfortably in no acute distress.  HEENT: Neck supple with normal range of motion.   Skin: No rashes  Neurologic:     Mental Status: Fully alert, attentive and oriented. Speech clear and fluent, no paraphasic errors.     Cranial Nerves: EOMI with normal smooth pursuit. Facial movements symmetric. Hearing not formally tested but intact to conversation.  No dysarthria.     Motor: No tremors or other abnormal movements observed.          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  I think she can add a little more to the afternoon dose, but also advised her to take note of days with breakthrough  pain and see if there is any pattern (foods or drinks eaten, when meal occurred, exercise or other activity).       Plan:  Lyrica 200/300  Contact me through [facility] in a few weeks to discuss response, if any   - would consider altering dosing structure to three times daily if no response    Follow up in Neurology clinic in 1.5 months, or earlier as needed should new concerns arise.    MICHI Eric D.O.   of Neurology    Total time today (28 min) in this patient encounter was spent on pre-charting, counseling and/or coordination of care. The longitudinal plan of care for the diagnosis(es)/condition(s) as documented were addressed during this visit. Due to the added complexity in care, I will continue to support Elo in the subsequent management and with ongoing continuity of care.

## 2024-11-08 ENCOUNTER — TELEPHONE (OUTPATIENT)
Dept: NEUROLOGY | Facility: CLINIC | Age: 79
End: 2024-11-08
Payer: MEDICARE

## 2024-11-08 NOTE — TELEPHONE ENCOUNTER
Requested by provider to assist with access to MyChart. Patient able to get in. Nothing further needed.

## 2024-12-01 ENCOUNTER — HEALTH MAINTENANCE LETTER (OUTPATIENT)
Age: 79
End: 2024-12-01

## 2024-12-10 DIAGNOSIS — G50.0 TRIGEMINAL NEURALGIA: ICD-10-CM

## 2024-12-10 RX ORDER — PREGABALIN 100 MG/1
CAPSULE ORAL
Qty: 228 CAPSULE | Refills: 7 | Status: SHIPPED | OUTPATIENT
Start: 2024-12-10

## 2024-12-19 DIAGNOSIS — G50.0 TRIGEMINAL NEURALGIA: ICD-10-CM

## 2024-12-19 RX ORDER — PREGABALIN 100 MG/1
CAPSULE ORAL
Qty: 600 CAPSULE | Refills: 2 | Status: SHIPPED | OUTPATIENT
Start: 2024-12-19

## 2025-03-15 ENCOUNTER — HEALTH MAINTENANCE LETTER (OUTPATIENT)
Age: 80
End: 2025-03-15

## 2025-05-01 ENCOUNTER — VIRTUAL VISIT (OUTPATIENT)
Dept: NEUROLOGY | Facility: CLINIC | Age: 80
End: 2025-05-01
Payer: MEDICARE

## 2025-05-01 DIAGNOSIS — R69 DIAGNOSIS DEFERRED: Primary | ICD-10-CM

## 2025-05-01 NOTE — LETTER
5/1/2025       RE: Karol Kae Mohs  [address]  [place] [address]     Dear Colleague,    Thank you for referring your patient, Karol Kae Mohs, to the [facility] NEUROLOGY CLINIC [place] at [facility]. Please see a copy of my visit note below.    [facility] Neurology Follow Up Visit    Karol Kae Mohs MRN# [id number]   Age: 79 year old YOB: 1945        Prior History and Update:   The patient was initially seen in neurologic consultation on 8/7/2020 and most recently 11/7/2024 for evaluation of TN (V2 oral, possibly b/l). Please see the comprehensive neurologic consultation notes from those dates in the Epic records for details.     Pertinent information:  - DRESS reaction to CBZ after initial trial  - Lyrica 200/200 was helpful but making her tired, so dosing switched to 200 at 7AM and 300 at 4PM.   - Episodes still occurring 3-4 times per week.    No show today. Unclear if we have the right number for the patient.  MICHI Eric D.O.  [facility] Neurology      Again, thank you for allowing me to participate in the care of your patient.      Sincerely,    Zain Eric, DO

## 2025-05-01 NOTE — PROGRESS NOTES
[facility] Neurology Follow Up Visit    Karol Kae Mohs MRN# [id number]   Age: 79 year old YOB: 1945        Prior History and Update:   The patient was initially seen in neurologic consultation on 8/7/2020 and most recently 11/7/2024 for evaluation of TN (V2 oral, possibly b/l). Please see the comprehensive neurologic consultation notes from those dates in the Epic records for details.     Pertinent information:  - DRESS reaction to CBZ after initial trial  - Lyrica 200/200 was helpful but making her tired, so dosing switched to 200 at 7AM and 300 at 4PM.   - Episodes still occurring 3-4 times per week.    No show today. Unclear if we have the right number for the patient.  MICHI Eric D.O.  [facility] Neurology

## 2025-05-13 ENCOUNTER — VIRTUAL VISIT (OUTPATIENT)
Dept: NEUROLOGY | Facility: CLINIC | Age: 80
End: 2025-05-13
Payer: MEDICARE

## 2025-05-13 DIAGNOSIS — G50.0 TRIGEMINAL NEURALGIA: ICD-10-CM

## 2025-05-13 DIAGNOSIS — G47.09 OTHER INSOMNIA: Primary | ICD-10-CM

## 2025-05-13 RX ORDER — BACLOFEN 10 MG/1
10 TABLET ORAL
Qty: 60 TABLET | Refills: 0 | Status: SHIPPED | OUTPATIENT
Start: 2025-05-13

## 2025-05-13 RX ORDER — PREGABALIN 100 MG/1
CAPSULE ORAL
Qty: 600 CAPSULE | Refills: 2 | Status: SHIPPED | OUTPATIENT
Start: 2025-05-13

## 2025-05-13 ASSESSMENT — PAIN SCALES - GENERAL: PAINLEVEL_OUTOF10: NO PAIN (0)

## 2025-05-13 NOTE — LETTER
5/13/2025       RE: Karol Kae Mohs  [address]  [place] [address]     Dear Colleague,    Thank you for referring your patient, Karol Kae Mohs, to the [facility] NEUROLOGY CLINIC [place] at [facility]. Please see a copy of my visit note below.    [facility] Neurology Follow Up Visit    Karol Kae Mohs MRN# [id number]   Age: 79 year old YOB: 1945            Assessment and Plan:   Assessment:  TN, b/l, V2    The patient's symptoms are blunted overall compared to when we first met, but still she has breakthough of pain in the evening.  This is less so than at our last visit, but I don't think she would tolerate another increase in lyrica without side-effects.  She may benefit from an as needed breakthrough medication, so I will recommend baclofen.  Beyond this, I asked she make taste and smell journal, mostly to see if there is a consistent pattern of sensory loss requiring new imaging to be considered.    Plan:  Lyrica 200/300  Baclofen 10 mg once in the evening PRN  Make a taste and smell diary for observation  Sleep medicine referral    Follow up in Neurology clinic in 3 months (virtual) or earlier as needed should new concerns arise.    MICHI Eric D.O.   of Neurology    Total time today (30 min) in this patient encounter was spent on pre-charting, counseling and/or coordination of care. The longitudinal plan of care for the diagnosis(es)/condition(s) as documented were addressed during this visit. Due to the added complexity in care, I will continue to support Elo in the subsequent management and with ongoing continuity of care.       Prior History and Update:   The patient was initially seen in neurologic consultation on 8/7/2020 and most recently 11/7/2024 for evaluation of TN (V2, oral, b/l). Please see the comprehensive neurologic consultation notes from those dates in the Epic records for details.     Prior  DRESS reaction to CBZ led to Lyrica 200/200 dosing, but this led to some tiredness around 1pm.  She was to trial a dose adjustment to 200/300, and take the medications at 0700 and 0400.  Episodes were still occurring 3-4 times per week.    She does feel she is having some mild taste changes regarding foods, they just don't taste as good.     Today, the patient feels her medicine is working fine.  She may still get an evening headache and does feel it relates to breakthrough pain in the mouth/nose. She is currently taking pregabalin 200 mg at 0700, and 300 mg at 4pm.  She is having breakthrough pain 3-4 times a week, occurring in non-specific times often in the evening. There is no clear trigger.  Sometimes it can last 1-2 hours.    She mentions having sleep issues, and that even on zolpidem through her PCP she is not getting restful continued sleep.  She has trouble getting to sleep in general, and doesn't attribute this to her facial sensory changes.     Physical Exam:   General: Seated comfortably in no acute distress.  Neurologic:     Mental Status: Fully alert, attentive and oriented. Speech clear and fluent, no paraphasic errors.     Cranial Nerves: EOMI with normal smooth pursuit. Facial movements symmetric. Hearing not formally tested but intact to conversation.  No dysarthria.     Motor: No tremors or other abnormal movements observed.       Virtual Visit Details    Type of service:  Video Visit   Video Start Time: 0658  Video End Time:7:19 AM    Originating Location (pt. Location): Home    Distant Location (provider location):  On-site  Platform used for Video Visit: AmWell      Again, thank you for allowing me to participate in the care of your patient.      Sincerely,    Zain Eric, DO

## 2025-05-13 NOTE — PROGRESS NOTES
[facility] Neurology Follow Up Visit    Karol Kae Mohs MRN# [id number]   Age: 79 year old YOB: 1945            Assessment and Plan:   Assessment:  TN, b/l, V2    The patient's symptoms are blunted overall compared to when we first met, but still she has breakthough of pain in the evening.  This is less so than at our last visit, but I don't think she would tolerate another increase in lyrica without side-effects.  She may benefit from an as needed breakthrough medication, so I will recommend baclofen.  Beyond this, I asked she make taste and smell journal, mostly to see if there is a consistent pattern of sensory loss requiring new imaging to be considered.    Plan:  Lyrica 200/300  Baclofen 10 mg once in the evening PRN  Make a taste and smell diary for observation  Sleep medicine referral    Follow up in Neurology clinic in 3 months (virtual) or earlier as needed should new concerns arise.    MICHI Eric D.O.   of Neurology    Total time today (30 min) in this patient encounter was spent on pre-charting, counseling and/or coordination of care. The longitudinal plan of care for the diagnosis(es)/condition(s) as documented were addressed during this visit. Due to the added complexity in care, I will continue to support Elo in the subsequent management and with ongoing continuity of care.       Prior History and Update:   The patient was initially seen in neurologic consultation on 8/7/2020 and most recently 11/7/2024 for evaluation of TN (V2, oral, b/l). Please see the comprehensive neurologic consultation notes from those dates in the Epic records for details.     Prior DRESS reaction to CBZ led to Lyrica 200/200 dosing, but this led to some tiredness around 1pm.  She was to trial a dose adjustment to 200/300, and take the medications at 0700 and 0400.  Episodes were still occurring 3-4 times per week.    She does feel she is having some mild taste changes regarding foods, they  just don't taste as good.     Today, the patient feels her medicine is working fine.  She may still get an evening headache and does feel it relates to breakthrough pain in the mouth/nose. She is currently taking pregabalin 200 mg at 0700, and 300 mg at 4pm.  She is having breakthrough pain 3-4 times a week, occurring in non-specific times often in the evening. There is no clear trigger.  Sometimes it can last 1-2 hours.    She mentions having sleep issues, and that even on zolpidem through her PCP she is not getting restful continued sleep.  She has trouble getting to sleep in general, and doesn't attribute this to her facial sensory changes.     Physical Exam:   General: Seated comfortably in no acute distress.  Neurologic:     Mental Status: Fully alert, attentive and oriented. Speech clear and fluent, no paraphasic errors.     Cranial Nerves: EOMI with normal smooth pursuit. Facial movements symmetric. Hearing not formally tested but intact to conversation.  No dysarthria.     Motor: No tremors or other abnormal movements observed.

## 2025-05-13 NOTE — NURSING NOTE
Current patient location: [address] [place] [address]    Is the patient currently in the state of [place]? YES    Visit mode: VIDEO    If the visit is dropped, the patient can be reconnected by:VIDEO VISIT: Text to cell phone:   Telephone Information:   Mobile [phone]       Will anyone else be joining the visit? NO  (If patient encounters technical issues they should call [phone])    Are changes needed to the allergy or medication list? No    Are refills needed on medications prescribed by this physician? NO    Rooming Documentation:  Questionnaire(s) not pre-assigned    Reason for visit: Follow Up    Zoila FLORES

## 2025-05-13 NOTE — PATIENT INSTRUCTIONS
Continue with Lyrica at 200 mg in the AM, and 300 mg around 4PM    Take baclofen 10 mg once in the evening for breakthrough pain, and keep a note of what/if-any side effects or response you have.    Keep a diary of your taste/smell issues for our review next visit    I placed a sleep medicine referral for your insomnia and restless sleep.

## 2025-05-13 NOTE — PROGRESS NOTES
Virtual Visit Details    Type of service:  Video Visit   Video Start Time: 0658  Video End Time:7:19 AM    Originating Location (pt. Location): Home    Distant Location (provider location):  On-site  Platform used for Video Visit: [employer]

## 2025-05-14 ENCOUNTER — PATIENT OUTREACH (OUTPATIENT)
Dept: CARE COORDINATION | Facility: CLINIC | Age: 80
End: 2025-05-14
Payer: MEDICARE

## 2025-05-19 ENCOUNTER — TELEPHONE (OUTPATIENT)
Dept: NEUROLOGY | Facility: CLINIC | Age: 80
End: 2025-05-19
Payer: MEDICARE

## 2025-05-19 NOTE — TELEPHONE ENCOUNTER
Left Voicemail (1st Attempt) for the patient to call back and schedule the following:    Appointment type: RETURN NEUROLOGY  Provider: FADY  Return date: 8/13/25  Specialty phone number: [phone]  Additional appointment(s) needed: N/A  Additonal Notes: N/A

## 2025-06-28 ENCOUNTER — HEALTH MAINTENANCE LETTER (OUTPATIENT)
Age: 80
End: 2025-06-28

## 2025-08-06 ENCOUNTER — OFFICE VISIT (OUTPATIENT)
Dept: SLEEP MEDICINE | Facility: CLINIC | Age: 80
End: 2025-08-06
Attending: PSYCHIATRY & NEUROLOGY
Payer: MEDICARE

## 2025-08-06 VITALS
RESPIRATION RATE: 12 BRPM | HEIGHT: 62 IN | OXYGEN SATURATION: 93 % | DIASTOLIC BLOOD PRESSURE: 72 MMHG | HEART RATE: 72 BPM | WEIGHT: 182.7 LBS | SYSTOLIC BLOOD PRESSURE: 108 MMHG | BODY MASS INDEX: 33.62 KG/M2

## 2025-08-06 DIAGNOSIS — G47.33 OSA (OBSTRUCTIVE SLEEP APNEA): Primary | ICD-10-CM

## 2025-08-06 DIAGNOSIS — G47.09 OTHER INSOMNIA: ICD-10-CM

## 2025-08-06 PROCEDURE — 1126F AMNT PAIN NOTED NONE PRSNT: CPT

## 2025-08-06 PROCEDURE — 99204 OFFICE O/P NEW MOD 45 MIN: CPT

## 2025-08-06 PROCEDURE — 3078F DIAST BP <80 MM HG: CPT

## 2025-08-06 PROCEDURE — 3074F SYST BP LT 130 MM HG: CPT

## 2025-08-06 RX ORDER — AMLODIPINE BESYLATE 10 MG/1
1 TABLET ORAL DAILY
COMMUNITY
Start: 2025-08-04

## 2025-08-06 ASSESSMENT — SLEEP AND FATIGUE QUESTIONNAIRES
HOW LIKELY ARE YOU TO NOD OFF OR FALL ASLEEP WHILE SITTING QUIETLY AFTER LUNCH WITHOUT ALCOHOL: WOULD NEVER DOZE
HOW LIKELY ARE YOU TO NOD OFF OR FALL ASLEEP WHILE SITTING AND TALKING TO SOMEONE: WOULD NEVER DOZE
HOW LIKELY ARE YOU TO NOD OFF OR FALL ASLEEP WHILE LYING DOWN TO REST IN THE AFTERNOON WHEN CIRCUMSTANCES PERMIT: SLIGHT CHANCE OF DOZING
HOW LIKELY ARE YOU TO NOD OFF OR FALL ASLEEP WHILE SITTING INACTIVE IN A PUBLIC PLACE: WOULD NEVER DOZE
HOW LIKELY ARE YOU TO NOD OFF OR FALL ASLEEP WHILE WATCHING TV: MODERATE CHANCE OF DOZING
HOW LIKELY ARE YOU TO NOD OFF OR FALL ASLEEP WHILE SITTING AND READING: SLIGHT CHANCE OF DOZING
HOW LIKELY ARE YOU TO NOD OFF OR FALL ASLEEP IN A CAR, WHILE STOPPED FOR A FEW MINUTES IN TRAFFIC: WOULD NEVER DOZE
HOW LIKELY ARE YOU TO NOD OFF OR FALL ASLEEP WHEN YOU ARE A PASSENGER IN A CAR FOR AN HOUR WITHOUT A BREAK: WOULD NEVER DOZE

## 2025-08-21 ENCOUNTER — VIRTUAL VISIT (OUTPATIENT)
Dept: NEUROLOGY | Facility: CLINIC | Age: 80
End: 2025-08-21
Payer: MEDICARE

## 2025-08-21 VITALS — BODY MASS INDEX: 33.49 KG/M2 | HEIGHT: 62 IN | WEIGHT: 182 LBS

## 2025-08-21 DIAGNOSIS — G50.0 TRIGEMINAL NEURALGIA: ICD-10-CM

## 2025-08-21 RX ORDER — BACLOFEN 10 MG/1
10 TABLET ORAL 3 TIMES DAILY PRN
Qty: 180 TABLET | Refills: 2 | Status: SHIPPED | OUTPATIENT
Start: 2025-08-21

## 2025-08-21 RX ORDER — PREGABALIN 100 MG/1
CAPSULE ORAL
Qty: 600 CAPSULE | Refills: 2 | Status: SHIPPED | OUTPATIENT
Start: 2025-08-21

## 2025-08-21 RX ORDER — BACLOFEN 10 MG/1
10 TABLET ORAL 3 TIMES DAILY PRN
COMMUNITY
End: 2025-08-21

## 2025-08-21 ASSESSMENT — PAIN SCALES - GENERAL: PAINLEVEL_OUTOF10: NO PAIN (0)

## 2025-08-30 ENCOUNTER — HEALTH MAINTENANCE LETTER (OUTPATIENT)
Age: 80
End: 2025-08-30